# Patient Record
Sex: MALE | Race: BLACK OR AFRICAN AMERICAN | NOT HISPANIC OR LATINO | Employment: FULL TIME | URBAN - METROPOLITAN AREA
[De-identification: names, ages, dates, MRNs, and addresses within clinical notes are randomized per-mention and may not be internally consistent; named-entity substitution may affect disease eponyms.]

---

## 2018-07-02 ENCOUNTER — HOSPITAL ENCOUNTER (EMERGENCY)
Facility: OTHER | Age: 58
Discharge: HOME | End: 2018-07-02
Attending: EMERGENCY MEDICINE
Payer: MEDICAID

## 2018-07-02 VITALS
TEMPERATURE: 98 F | OXYGEN SATURATION: 96 % | HEART RATE: 66 BPM | HEIGHT: 74 IN | SYSTOLIC BLOOD PRESSURE: 172 MMHG | RESPIRATION RATE: 16 BRPM | BODY MASS INDEX: 35.29 KG/M2 | WEIGHT: 275 LBS | DIASTOLIC BLOOD PRESSURE: 94 MMHG

## 2018-07-02 DIAGNOSIS — L03.116 CELLULITIS AND ABSCESS OF LEFT LOWER EXTREMITY: Primary | ICD-10-CM

## 2018-07-02 DIAGNOSIS — L02.416 CELLULITIS AND ABSCESS OF LEFT LOWER EXTREMITY: Primary | ICD-10-CM

## 2018-07-02 PROCEDURE — 25000003 PHARM REV CODE 250: Performed by: EMERGENCY MEDICINE

## 2018-07-02 PROCEDURE — 99283 EMERGENCY DEPT VISIT LOW MDM: CPT | Mod: 25

## 2018-07-02 PROCEDURE — 10061 I&D ABSCESS COMP/MULTIPLE: CPT

## 2018-07-02 RX ORDER — SULFAMETHOXAZOLE AND TRIMETHOPRIM 800; 160 MG/1; MG/1
1 TABLET ORAL
Status: COMPLETED | OUTPATIENT
Start: 2018-07-02 | End: 2018-07-02

## 2018-07-02 RX ORDER — ACETAMINOPHEN 500 MG
1000 TABLET ORAL
Status: COMPLETED | OUTPATIENT
Start: 2018-07-02 | End: 2018-07-02

## 2018-07-02 RX ORDER — IBUPROFEN 400 MG/1
400 TABLET ORAL 3 TIMES DAILY PRN
Qty: 20 TABLET | Refills: 0 | Status: SHIPPED | OUTPATIENT
Start: 2018-07-02 | End: 2021-02-04

## 2018-07-02 RX ORDER — LIDOCAINE HYDROCHLORIDE AND EPINEPHRINE 20; 10 MG/ML; UG/ML
1 INJECTION, SOLUTION INFILTRATION; PERINEURAL ONCE
Status: COMPLETED | OUTPATIENT
Start: 2018-07-02 | End: 2018-07-02

## 2018-07-02 RX ORDER — SULFAMETHOXAZOLE AND TRIMETHOPRIM 800; 160 MG/1; MG/1
1 TABLET ORAL 2 TIMES DAILY
Qty: 14 TABLET | Refills: 0 | Status: SHIPPED | OUTPATIENT
Start: 2018-07-02 | End: 2018-07-09

## 2018-07-02 RX ORDER — IBUPROFEN 400 MG/1
400 TABLET ORAL
Status: COMPLETED | OUTPATIENT
Start: 2018-07-02 | End: 2018-07-02

## 2018-07-02 RX ADMIN — IBUPROFEN 400 MG: 400 TABLET, FILM COATED ORAL at 08:07

## 2018-07-02 RX ADMIN — LIDOCAINE HYDROCHLORIDE,EPINEPHRINE BITARTRATE 1 ML: 20; .01 INJECTION, SOLUTION INFILTRATION; PERINEURAL at 07:07

## 2018-07-02 RX ADMIN — SULFAMETHOXAZOLE AND TRIMETHOPRIM 1 TABLET: 800; 160 TABLET ORAL at 08:07

## 2018-07-02 RX ADMIN — ACETAMINOPHEN 1000 MG: 500 TABLET, FILM COATED ORAL at 08:07

## 2018-07-02 NOTE — ED PROVIDER NOTES
Encounter Date: 7/2/2018    SCRIBE #1 NOTE: I, Karissagray Reeder, am scribing for, and in the presence of, Dr. Song.       History     Chief Complaint   Patient presents with    Abscess     inside of left thigh, started off as a skin tag but noticed yesteday it was hurting and today, it is hard and very painful     Time seen by provider: 7:45 AM    This is a 57 y.o. male, with history of DM, who presents with complaint of abscess to the left thigh. He reports it began as a skin tag that he has had most of his life. The area became hard and sore two days ago. He denies redness or drainage. He reports history of abscess.      The history is provided by the patient.     Review of patient's allergies indicates:  Allergies not on file  Past Medical History:   Diagnosis Date    Diabetes mellitus     Pt is no longer has DM, managed with diet    Psoriasis      History reviewed. No pertinent surgical history.  Family History   Problem Relation Age of Onset    Diabetes Mother     Hypertension Mother     Cancer Mother         Breast     Diabetes Father     Hypertension Father     Cancer Father         Prostate     Social History   Substance Use Topics    Smoking status: Never Smoker    Smokeless tobacco: Never Used    Alcohol use Yes      Comment: Socially     Review of Systems   Constitutional: Negative for chills and fever.   HENT: Negative for sore throat.    Respiratory: Negative for shortness of breath.    Cardiovascular: Negative for chest pain.   Gastrointestinal: Negative for nausea.   Genitourinary: Negative for dysuria.   Musculoskeletal: Negative for back pain.   Skin: Positive for wound (abscess). Negative for color change and rash.        Negative for drainage.   Neurological: Negative for weakness and numbness.   Hematological: Does not bruise/bleed easily.       Physical Exam     Initial Vitals [07/02/18 0729]   BP Pulse Resp Temp SpO2   (!) 174/98 64 16 97.8 °F (36.6 °C) 98 %      MAP       --          Physical Exam    Nursing note and vitals reviewed.  Constitutional: He appears well-developed and well-nourished. He is not diaphoretic. No distress.   HENT:   Head: Normocephalic and atraumatic.   Eyes: Conjunctivae and EOM are normal. No scleral icterus.   Neck: Normal range of motion. Neck supple.   Cardiovascular: Normal rate, regular rhythm and normal heart sounds. Exam reveals no gallop and no friction rub.    No murmur heard.  Pulmonary/Chest: Breath sounds normal. No respiratory distress. He has no wheezes. He has no rhonchi. He has no rales.   Musculoskeletal: Normal range of motion. He exhibits no edema.   Neurological: He is alert and oriented to person, place, and time.   Skin: Skin is warm and dry.   Over proximal and medial left thigh, he has a skin tag that is swollen, edematous, and tender. Approximately 3 cm in diameter and raised above the rest of the skin by another 3 cm. Fluctuance at base with surrounding induration, erythema, and tenderness. There is no erythema, tenderness, or skin changes to scrotum or perineum. No streaking erythema.         ED Course   I & D - Incision and Drainage  Date/Time: 7/2/2018 8:41 AM  Performed by: JESSY DUDLEY  Authorized by: JESSY DUDLEY   Consent Done: Not Needed  Type: abscess  Body area: lower extremity (top of skin tag)  Location details: left leg  Anesthesia: local infiltration    Anesthesia:  Local Anesthetic: lidocaine 1% with epinephrine  Anesthetic total: 5 mL  Patient sedated: no  Scalpel size: 11  Incision type: 1.5 cm.  Drainage: purulent  Drainage amount: scant  Wound treatment: incision and  drainage  Packing material: none  Patient tolerance: Patient tolerated the procedure well with no immediate complications    I & D - Incision and Drainage  Date/Time: 7/2/2018 8:50 AM  Performed by: JESSY DUDLEY  Authorized by: JESSY DUDLEY   Consent Done: Not Needed  Type: abscess  Body area: lower extremity (base of skin tag)  Location  details: left leg  Anesthesia: local infiltration    Anesthesia:  Local Anesthetic: lidocaine 1% with epinephrine  Patient sedated: no  Scalpel size: 11  Incision type: 1.5 cm.  Drainage: bloody and  purulent  Drainage amount: copious  Wound treatment: incision and  drainage  Packing material: none  Patient tolerance: Patient tolerated the procedure well with no immediate complications        Labs Reviewed - No data to display       Imaging Results    None          Medical Decision Making:   ED Management:  Well-appearing patient presents complaining of a skin abscess.  He reports he had a skin tag for some time but it has never been painful or large.  Works for the weekend swelling in size became painful.  He has a history of a prior abscesses armpit in this feels very similar.  On exam it does appear indurated and fluctuant.  Incision and drainage performed as described above.  When I incised the top of the skin tag limited purulence comes out, but flexion is remains, so I performed another incision at the base, this does provide extensive return of purulence.  Dressed.  Started on Bactrim.  Encouraged follow up with primary care 3 days for wound check, return here if worse.    I did have an extensive talk regarding signs to return for and need for follow up. Patient expressed understanding and will monitor symptoms closely and follow-up as needed.    MARILYN Song M.D.  07/02/2018  2:15 PM              Scribe Attestation:   Scribe #1: I performed the above scribed service and the documentation accurately describes the services I performed. I attest to the accuracy of the note.    Attending Attestation:           Physician Attestation for Scribe:  Physician Attestation Statement for Scribe #1: I, Dr. Song, reviewed documentation, as scribed by Karissa Reeder in my presence, and it is both accurate and complete.                    Clinical Impression:     1. Cellulitis and abscess of left lower extremity                                  Remy Song MD  07/02/18 1292

## 2018-07-02 NOTE — ED NOTES
"Pt presents with c/o a skin tag/abcess located on the upper left inner thigh. Pt states he has had it for "as long as he can remember." He states it recently became painful and hard on Saturday. Per pt it got larger over the years as he gained weight. Pt states pain only occurs when ambulating, when sitting/lying down it does not bother him. Pt has no further complaints/conceerns. Pt is AAOx4, on continuous BP and pulse ox monitoring.    "

## 2019-08-16 ENCOUNTER — OFFICE VISIT (OUTPATIENT)
Dept: OPHTHALMOLOGY | Facility: CLINIC | Age: 59
End: 2019-08-16
Payer: COMMERCIAL

## 2019-08-16 ENCOUNTER — HOSPITAL ENCOUNTER (EMERGENCY)
Facility: HOSPITAL | Age: 59
Discharge: HOME | End: 2019-08-16
Attending: EMERGENCY MEDICINE
Payer: COMMERCIAL

## 2019-08-16 VITALS
HEART RATE: 52 BPM | SYSTOLIC BLOOD PRESSURE: 163 MMHG | WEIGHT: 267 LBS | BODY MASS INDEX: 33.2 KG/M2 | DIASTOLIC BLOOD PRESSURE: 95 MMHG | TEMPERATURE: 98 F | RESPIRATION RATE: 20 BRPM | HEIGHT: 75 IN | OXYGEN SATURATION: 98 %

## 2019-08-16 DIAGNOSIS — B02.30 HERPES ZOSTER WITH OPHTHALMIC COMPLICATION, UNSPECIFIED HERPES ZOSTER EYE DISEASE: Primary | ICD-10-CM

## 2019-08-16 DIAGNOSIS — H40.023 OAG (OPEN ANGLE GLAUCOMA) SUSPECT, HIGH RISK, BILATERAL: ICD-10-CM

## 2019-08-16 DIAGNOSIS — B02.39 DERMATITIS OF EYELID OF LEFT EYE DUE TO HERPES ZOSTER: ICD-10-CM

## 2019-08-16 PROCEDURE — 99999 PR PBB SHADOW E&M-EST. PATIENT-LVL II: CPT | Mod: PBBFAC,,, | Performed by: OPHTHALMOLOGY

## 2019-08-16 PROCEDURE — 99282 EMERGENCY DEPT VISIT SF MDM: CPT

## 2019-08-16 PROCEDURE — 92004 COMPRE OPH EXAM NEW PT 1/>: CPT | Mod: S$GLB,,, | Performed by: OPHTHALMOLOGY

## 2019-08-16 PROCEDURE — 92004 PR EYE EXAM, NEW PATIENT,COMPREHESV: ICD-10-PCS | Mod: S$GLB,,, | Performed by: OPHTHALMOLOGY

## 2019-08-16 PROCEDURE — 99999 PR PBB SHADOW E&M-EST. PATIENT-LVL II: ICD-10-PCS | Mod: PBBFAC,,, | Performed by: OPHTHALMOLOGY

## 2019-08-16 RX ORDER — VALACYCLOVIR HYDROCHLORIDE 1 G/1
1000 TABLET, FILM COATED ORAL 3 TIMES DAILY
Qty: 21 TABLET | Refills: 0 | Status: SHIPPED | OUTPATIENT
Start: 2019-08-16 | End: 2020-05-11

## 2019-08-16 RX ORDER — CEFDINIR 300 MG/1
CAPSULE ORAL
Refills: 0 | COMMUNITY
Start: 2019-08-14 | End: 2021-02-04

## 2019-08-16 RX ORDER — ERYTHROMYCIN 5 MG/G
OINTMENT OPHTHALMIC
Qty: 1 TUBE | Refills: 1 | Status: SHIPPED | OUTPATIENT
Start: 2019-08-16 | End: 2020-05-11

## 2019-08-16 NOTE — PATIENT INSTRUCTIONS
Take one Valtrex three times a day for one week.  Use the ointment on open blisters three times a day until crusted.  Appointment with Dr. Mcgraw to see if you need treatment for glaucoma.

## 2019-08-16 NOTE — PROGRESS NOTES
HPI     Follow-up      Additional comments: Herpes zoster with ophthalmic complication              Comments     Patient here for EDfu for shingles poss ocular involvement.  Pt states bumps on forehead and eye x yesterday, but started on left side   scalp area.  Pt states OS red and irritated.  No eye pain or drops.    I have personally interviewed the patient, reviewed the history and   examined the patient and agree with the technician's exam.     Mother has glaucoma.          Last edited by Sae Jon MD on 8/16/2019 10:53 AM. (History)            Assessment /Plan     For exam results, see Encounter Report.    Dermatitis of eyelid of left eye due to herpes zoster  -     valACYclovir (VALTREX) 1000 MG tablet; Take 1 tablet (1,000 mg total) by mouth 3 (three) times daily. for 7 days  Dispense: 21 tablet; Refill: 0  -     erythromycin (ROMYCIN) ophthalmic ointment; Apply to lesions of left upper eyelid three times a day until lesions are crusted over.  Dispense: 1 Tube; Refill: 1    OAG (open angle glaucoma) suspect, high risk, bilateral      Medications for shingles as prescribed above. Appointment with Dr. Mcgraw for possible treatment for glaucoma. Return to me as needed.

## 2019-08-16 NOTE — ED NOTES
Spoke to ophthalmology at [facility]. RN set up an appointment for pt today at 1000 with Dr. Jon at [facility] opthalmology

## 2019-08-16 NOTE — ED NOTES
Pt reports red rash noted to left side of forehead and scalp and to left eye. Pt reports that rash started on the scalp 2 days ago and progressed to the left forehead and to left eye. Pt reports left is noted to be teary and red but denies blurry vision or any difficulty seeing. Pt reports slight swelling to under left eye. Pt denies rash any where else on the body. Denies itchiness of rash but reports mild tenderness. Pt is NAD.

## 2019-08-16 NOTE — ED PROVIDER NOTES
Encounter Date: 8/16/2019    SCRIBE #1 NOTE: I, Elizabeth Arana, am scribing for, and in the presence of,  Dr. Ortiz . I have scribed the entire note.       History     Chief Complaint   Patient presents with    Rash     rash to left side of scalp/forehead that is radiating down patients face to left eye area x 2 days     Time seen by provider: 7:20 AM    This is a 58 y.o. male who presents with complaint of rash that started two days ago. Pt reports he went to the urgent care on 8/14//2019 for pain to the top of his scalp and ear pain. He states at this time he was prescribed medication for his ear infection. Pt stats he then began noticing a rash that has now traveled down from his scalp to the left side of his forehead. He notes associated headache but denies any visual disturbance or fever. Pt has a PMHx of Psoriasis.    The history is provided by the patient.     Review of patient's allergies indicates:  No Known Allergies  Past Medical History:   Diagnosis Date    Diabetes mellitus     Pt is no longer has DM, managed with diet    Psoriasis      History reviewed. No pertinent surgical history.  Family History   Problem Relation Age of Onset    Diabetes Mother     Hypertension Mother     Cancer Mother         Breast     Diabetes Father     Hypertension Father     Cancer Father         Prostate     Social History     Tobacco Use    Smoking status: Never Smoker    Smokeless tobacco: Never Used   Substance Use Topics    Alcohol use: Yes     Comment: Socially    Drug use: No     Review of Systems   Constitutional: Negative for fever.   HENT: Negative for sore throat.    Eyes: Negative for visual disturbance.   Respiratory: Negative for shortness of breath.    Cardiovascular: Negative for chest pain.   Gastrointestinal: Negative for nausea.   Genitourinary: Negative for dysuria.   Musculoskeletal: Negative for back pain.   Skin: Positive for rash.   Neurological: Positive for headaches. Negative for  weakness.   Hematological: Does not bruise/bleed easily.       Physical Exam     Initial Vitals [08/16/19 0708]   BP Pulse Resp Temp SpO2   135/80 80 20 98 °F (36.7 °C) 99 %      MAP       --         Physical Exam    Nursing note and vitals reviewed.  Constitutional: He appears well-developed and well-nourished.   HENT:   Head: Normocephalic and atraumatic.   Eyes: EOM are normal. Pupils are equal, round, and reactive to light.   Left sided conjunctival injection.   Cardiovascular: Normal rate, regular rhythm and normal heart sounds.   No murmur heard.  Pulmonary/Chest: Breath sounds normal. No respiratory distress. He has no wheezes.   Neurological: He is alert and oriented to person, place, and time.   Skin: Skin is warm and dry.   Multiple scattered vesicular lesions with an erythematous base involving the left temporal scalp extending to the left forehead and nose.  Lesions do not cross midline.      Psychiatric: He has a normal mood and affect.         ED Course   Procedures  Labs Reviewed - No data to display          Medical Decision Making:   ED Management:  60-year-old male with herpes zoster.  There appears to be some involvement of the left eye. [facility] was notified so that I could arrange close follow-up with Ophthalmology.  An appointment has been made for the patient at 10:00 a.m. this morning.                      Clinical Impression:       ICD-10-CM ICD-9-CM   1. Herpes zoster with ophthalmic complication, unspecified herpes zoster eye disease B02.30 053.29            I, Dr. Yong Ortiz, personally performed the services described in this documentation. All medical record entries made by the scribe were at my direction and in my presence. I have reviewed the chart and agree that the record reflects my personal performance and is accurate and complete. Yong Ortiz MD.  9:43 AM 08/16/2019                      Yong Ortiz MD  08/16/19 0994

## 2019-10-18 ENCOUNTER — OFFICE VISIT (OUTPATIENT)
Dept: OPHTHALMOLOGY | Facility: CLINIC | Age: 59
End: 2019-10-18
Payer: COMMERCIAL

## 2019-10-18 DIAGNOSIS — Z86.19 HISTORY OF SHINGLES: ICD-10-CM

## 2019-10-18 DIAGNOSIS — H40.023 OPEN ANGLE WITH BORDERLINE FINDINGS AND HIGH GLAUCOMA RISK IN BOTH EYES: Primary | ICD-10-CM

## 2019-10-18 DIAGNOSIS — H52.4 MYOPIA OF BOTH EYES WITH ASTIGMATISM AND PRESBYOPIA: ICD-10-CM

## 2019-10-18 DIAGNOSIS — H52.203 MYOPIA OF BOTH EYES WITH ASTIGMATISM AND PRESBYOPIA: ICD-10-CM

## 2019-10-18 DIAGNOSIS — H52.13 MYOPIA OF BOTH EYES WITH ASTIGMATISM AND PRESBYOPIA: ICD-10-CM

## 2019-10-18 DIAGNOSIS — H25.13 NUCLEAR SCLEROSIS OF BOTH EYES: ICD-10-CM

## 2019-10-18 DIAGNOSIS — H35.54 RPE (RETINAL PIGMENT EPITHELIUM) HYPERTROPHY: ICD-10-CM

## 2019-10-18 PROCEDURE — 99999 PR PBB SHADOW E&M-EST. PATIENT-LVL II: CPT | Mod: PBBFAC,,, | Performed by: OPHTHALMOLOGY

## 2019-10-18 PROCEDURE — 92014 PR EYE EXAM, EST PATIENT,COMPREHESV: ICD-10-PCS | Mod: S$GLB,,, | Performed by: OPHTHALMOLOGY

## 2019-10-18 PROCEDURE — 92020 GONIOSCOPY: CPT | Mod: S$GLB,,, | Performed by: OPHTHALMOLOGY

## 2019-10-18 PROCEDURE — 92020 PR SPECIAL EYE EVAL,GONIOSCOPY: ICD-10-PCS | Mod: S$GLB,,, | Performed by: OPHTHALMOLOGY

## 2019-10-18 PROCEDURE — 99999 PR PBB SHADOW E&M-EST. PATIENT-LVL II: ICD-10-PCS | Mod: PBBFAC,,, | Performed by: OPHTHALMOLOGY

## 2019-10-18 PROCEDURE — 92014 COMPRE OPH EXAM EST PT 1/>: CPT | Mod: S$GLB,,, | Performed by: OPHTHALMOLOGY

## 2019-10-18 PROCEDURE — 92250 COLOR FUNDUS PHOTOGRAPHY - OU - BOTH EYES: ICD-10-PCS | Mod: S$GLB,,, | Performed by: OPHTHALMOLOGY

## 2019-10-18 PROCEDURE — 92250 FUNDUS PHOTOGRAPHY W/I&R: CPT | Mod: S$GLB,,, | Performed by: OPHTHALMOLOGY

## 2019-10-18 NOTE — PROGRESS NOTES
HPI     Glaucoma      Additional comments: gaucoma eval              Comments     Pt here for glaucoma evaluation per Dr. Jon. Pt states that he was not   being treated for glaucoma before today.  + FmHx - mother    1. Glaucoma Suspect  2. Hx Dermatitis OS due to HZV           Last edited by Marylin Mcgraw MD on 10/18/2019  8:38 AM. (History)            Assessment /Plan     For exam results, see Encounter Report.    Open angle with borderline findings and high glaucoma risk in both eyes  -     Hall Visual Field - OU - Extended - Both Eyes; Future  -     Posterior Segment OCT Optic Nerve- Both eyes; Future  -     Color Fundus Photography - OU - Both Eyes    Nuclear sclerosis of both eyes    RPE (retinal pigment epithelium) hypertrophy    Myopia of both eyes with astigmatism and presbyopia    History of shingles        Glaucoma supspect 2/2 lrg CDR ou - 0.75 ou   IOP nl 20-21 ou   Gonio open     Retinal pigmetary hypertrophy along inf arcades OU    ? Congential vs old inflammatory disease - it is VERY symmetric btw the 2 eyes     NS - mild     Myopia / astigmatism / hyperopia    Glasses     H/O shingles    V1 on left - 8/2019   No ocular complications     PLAN  Hold off on glaucoma gtts   Photos today   Consult retina - retinal pigment changes     F/U with HVF / and OCT ou // CCT   Fix Epic glaucoma abstract next visit

## 2019-10-18 NOTE — LETTER
October 18, 2019      Sae Jon MD  [address]  [facility] [address]           [facility]  [address]  [facility] [address]  Phone: [phone]  Fax: [phone]          Patient: Nuno Tejeda   MR Number: [id number]   YOB: 1960   Date of Visit: 10/18/2019       Dear Dr. Sae Jon:    Thank you for referring Nuno Tejeda to me for evaluation. Attached you will find relevant portions of my assessment and plan of care.    If you have questions, please do not hesitate to call me. I look forward to following Nuno Tejeda along with you.    Sincerely,    Marylin Mcgraw MD    Enclosure  CC:  No Recipients    If you would like to receive this communication electronically, please contact [email or web address] or [phone] to request more information on [employer] Link access.    For providers and/or their staff who would like to refer a patient to [facility], please contact us through our one-stop-shop provider referral line, [facility], at [phone].    If you feel you have received this communication in error or would no longer like to receive these types of communications, please e-mail [email or web address]

## 2019-10-30 ENCOUNTER — OFFICE VISIT (OUTPATIENT)
Dept: PODIATRY | Facility: CLINIC | Age: 59
End: 2019-10-30
Payer: COMMERCIAL

## 2019-10-30 VITALS
SYSTOLIC BLOOD PRESSURE: 152 MMHG | BODY MASS INDEX: 32.51 KG/M2 | HEART RATE: 67 BPM | DIASTOLIC BLOOD PRESSURE: 94 MMHG | HEIGHT: 76 IN | WEIGHT: 267 LBS

## 2019-10-30 DIAGNOSIS — M20.42 HAMMER TOES OF BOTH FEET: ICD-10-CM

## 2019-10-30 DIAGNOSIS — B35.1 ONYCHOMYCOSIS DUE TO DERMATOPHYTE: ICD-10-CM

## 2019-10-30 DIAGNOSIS — M20.41 HAMMER TOES OF BOTH FEET: ICD-10-CM

## 2019-10-30 DIAGNOSIS — B35.3 TINEA PEDIS OF BOTH FEET: ICD-10-CM

## 2019-10-30 DIAGNOSIS — L84 CORN OR CALLUS: Primary | ICD-10-CM

## 2019-10-30 PROCEDURE — 99203 PR OFFICE/OUTPT VISIT, NEW, LEVL III, 30-44 MIN: ICD-10-PCS | Mod: ,,, | Performed by: PODIATRIST

## 2019-10-30 PROCEDURE — 3008F BODY MASS INDEX DOCD: CPT | Mod: CPTII,S$GLB,, | Performed by: PODIATRIST

## 2019-10-30 PROCEDURE — 3008F PR BODY MASS INDEX (BMI) DOCUMENTED: ICD-10-PCS | Mod: CPTII,S$GLB,, | Performed by: PODIATRIST

## 2019-10-30 PROCEDURE — 99999 PR PBB SHADOW E&M-EST. PATIENT-LVL III: ICD-10-PCS | Mod: PBBFAC,,, | Performed by: PODIATRIST

## 2019-10-30 PROCEDURE — 17999 UNLISTD PX SKN MUC MEMB SUBQ: CPT | Mod: CSM,S$GLB,, | Performed by: PODIATRIST

## 2019-10-30 PROCEDURE — 99999 PR PBB SHADOW E&M-EST. PATIENT-LVL III: CPT | Mod: PBBFAC,,, | Performed by: PODIATRIST

## 2019-10-30 PROCEDURE — 17999 PR NON-COVERED FOOT CARE: ICD-10-PCS | Mod: CSM,S$GLB,, | Performed by: PODIATRIST

## 2019-10-30 PROCEDURE — 99203 OFFICE O/P NEW LOW 30 MIN: CPT | Mod: ,,, | Performed by: PODIATRIST

## 2019-10-30 RX ORDER — CICLOPIROX OLAMINE 7.7 MG/G
CREAM TOPICAL 2 TIMES DAILY
Qty: 90 G | Refills: 2 | Status: SHIPPED | OUTPATIENT
Start: 2019-10-30

## 2019-10-30 RX ORDER — CICLOPIROX 80 MG/ML
SOLUTION TOPICAL NIGHTLY
Qty: 6.6 ML | Refills: 11 | Status: SHIPPED | OUTPATIENT
Start: 2019-10-30 | End: 2022-02-15

## 2019-10-30 NOTE — PROGRESS NOTES
Subjective:      Patient ID: Nuno Tejeda is a 58 y.o. male.    Chief Complaint: Diabetes Mellitus (dr kiarra denny/ will see pcp within the next 6 wks); Diabetic Foot Exam; and Callouses    Nuno is a 58 y.o. male who presents to the clinic upon referral from Dr. Brunson  for evaluation and treatment of diabetic feet. Nuno has a past medical history of Cataract, Diabetes mellitus, Glaucoma, and Psoriasis. Patient relates no major problem with feet. Only complaints today consist of Diabetes, increased risk amputation needing evaluation/management/optomization of foot care - relates diet control only - not in problem list, no meds for DM, no PCP listed to verify.        CC2 thickl hard callus and thick discolored toenails both feet.  Gradual onset, worsening over past several weeks, aggravated by increased weight bearing, shoe gear, pressure.  No previous medical treatment.  OTC med not helping. Denies trauma, surgery.    PCP: [facility]    Date Last Seen by PCP:   Chief Complaint   Patient presents with    Diabetes Mellitus     dr kiarra denny/ will see pcp within the next 6 wks    Diabetic Foot Exam    Edward         Current shoe gear: Casual shoes    No results found for: HGBA1C          Review of Systems   Constitution: Negative for chills, diaphoresis, fever, malaise/fatigue and night sweats.   Cardiovascular: Negative for claudication, cyanosis, leg swelling and syncope.   Skin: Positive for nail changes and suspicious lesions. Negative for color change, dry skin, rash and unusual hair distribution.   Musculoskeletal: Negative for falls, joint pain, joint swelling, muscle cramps, muscle weakness and stiffness.   Gastrointestinal: Negative for constipation, diarrhea, nausea and vomiting.   Neurological: Positive for sensory change. Negative for brief paralysis, disturbances in coordination, focal weakness, numbness, paresthesias and tremors.           Objective:       Physical Exam   Constitutional: He is oriented to person, place, and time. He appears well-developed and well-nourished. He is cooperative. No distress.   Cardiovascular:   Pulses:       Popliteal pulses are 2+ on the right side, and 2+ on the left side.        Dorsalis pedis pulses are 2+ on the right side, and 2+ on the left side.        Posterior tibial pulses are 2+ on the right side, and 2+ on the left side.   Capillary refill 3 seconds all toes/distal feet, all toes/both feet warm to touch.      Negative lymphadenopathy bilateral popliteal fossa and tarsal tunnel.      Negavie lower extremity edema bilateral.     Musculoskeletal:        Right ankle: He exhibits normal range of motion, no swelling, no ecchymosis, no deformity, no laceration and normal pulse. Achilles tendon normal. Achilles tendon exhibits no pain, no defect and normal Patricia's test results.   ;Patient has hammertoes of digits   2-5 bilateral                partially reducible without symptom today.    Otherwise, Normal angle, base, station of gait. All ten toes without clubbing, cyanosis, or signs of ischemia.  No pain to palpation bilateral lower extremities.  Range of motion, stability, muscle strength, and muscle tone normal bilateral feet and legs.        Lymphadenopathy: No inguinal adenopathy noted on the right or left side.   Negative lymphadenopathy bilateral popliteal fossa and tarsal tunnel.    Negative lymphangitic streaking bilateral feet/ankles/legs.   Neurological: He is alert and oriented to person, place, and time. He has normal strength. He displays no atrophy and no tremor. No sensory deficit. He exhibits normal muscle tone. Gait normal.   Reflex Scores:       Patellar reflexes are 2+ on the right side and 2+ on the left side.       Achilles reflexes are 2+ on the right side and 2+ on the left side.  Negative tinel sign to percussion sural, superficial peroneal, deep peroneal, saphenous, and posterior tibial nerves right  and left ankles and feet.       Skin: Skin is warm, dry and intact. Capillary refill takes 2 to 3 seconds. No abrasion, no bruising, no burn, no ecchymosis, no laceration, no lesion and no rash noted. He is not diaphoretic. No cyanosis or erythema. No pallor. Nails show no clubbing.     Focal hyperkeratotic lesion consisting entirely of hyperkeratotic tissue without open skin, drainage, pus, fluctuance, malodor, or signs of infection plantar right and left 2nd mtpj.    Toenails 1st, 2nd, 3rd, 4th, 5th  bilateral are hypertrophic thickened 2-3 mm, dystrophic, discolored tanish brown with tan, gray crumbly subungual debris.  Tender to distal nail plate pressure, without periungual skin abnormality of each.    Dry scale with superficial flakes over an erythematous base plantar feet bilateral without ulceration, drainage, pus, tracking, fluctuance, malodor, or cardinal signs infection.    Otherwise, Skin is normal age and health appropriate color, turgor, texture, and temperature bilateral lower extremities without ulceration, hyperpigmentation, discoloration, masses nodules or cords palpated.  No ecchymosis, erythema, edema, or cardinal signs of infection bilateral lower extremities.     Psychiatric: He has a normal mood and affect.             Assessment:       Encounter Diagnoses   Name Primary?    Corn or callus Yes    Onychomycosis due to dermatophyte     Hammer toes of both feet          Plan:       Nuno was seen today for diabetes mellitus, diabetic foot exam and callouses.    Diagnoses and all orders for this visit:    Corn or callus    Onychomycosis due to dermatophyte    Hammer toes of both feet    Other orders  -     ciclopirox (PENLAC) 8 % Soln; Apply topically nightly.  -     ciclopirox (LOPROX) 0.77 % Crea; Apply topically 2 (two) times daily.      I counseled the patient on his conditions, their implications and medical management.        - Shoe inspection. Diabetic Foot Education. Patient reminded of  the importance of good nutrition and blood sugar control to help prevent podiatric complications of diabetes. Patient instructed on proper foot hygeine. We discussed wearing proper shoe gear, daily foot inspections, never walking without protective shoe gear, never putting sharp instruments to feet, routine podiatric visits at least annually.    Discussed conservative treatment with shoes of adequate dimensions, material, and style to alleviate symptoms and delay or prevent surgical intervention.    Rx penlac, loprox cream    Non covered footcare:      With the patient's permission, I debrided hyperkeratotic lesion(s) as above totaling     2           to, not  Including dermis with sterile #15 blade.  Patient tolerated the procedure well and related significant relief.              No follow-ups on file.

## 2019-11-25 ENCOUNTER — OFFICE VISIT (OUTPATIENT)
Dept: OPHTHALMOLOGY | Facility: CLINIC | Age: 59
End: 2019-11-25
Payer: COMMERCIAL

## 2019-11-25 VITALS — DIASTOLIC BLOOD PRESSURE: 92 MMHG | SYSTOLIC BLOOD PRESSURE: 147 MMHG | HEART RATE: 74 BPM

## 2019-11-25 DIAGNOSIS — H35.50 HEREDITARY RETINAL DYSTROPHY: Primary | ICD-10-CM

## 2019-11-25 PROCEDURE — 92134 POSTERIOR SEGMENT OCT RETINA (OCULAR COHERENCE TOMOGRAPHY)-BOTH EYES: ICD-10-PCS | Mod: S$GLB,,, | Performed by: OPHTHALMOLOGY

## 2019-11-25 PROCEDURE — 92014 PR EYE EXAM, EST PATIENT,COMPREHESV: ICD-10-PCS | Mod: S$GLB,,, | Performed by: OPHTHALMOLOGY

## 2019-11-25 PROCEDURE — 92225 PR SPECIAL EYE EXAM, INITIAL: ICD-10-PCS | Mod: LT,S$GLB,, | Performed by: OPHTHALMOLOGY

## 2019-11-25 PROCEDURE — 92225 PR SPECIAL EYE EXAM, INITIAL: CPT | Mod: RT,S$GLB,, | Performed by: OPHTHALMOLOGY

## 2019-11-25 PROCEDURE — 92134 CPTRZ OPH DX IMG PST SGM RTA: CPT | Mod: S$GLB,,, | Performed by: OPHTHALMOLOGY

## 2019-11-25 PROCEDURE — 92014 COMPRE OPH EXAM EST PT 1/>: CPT | Mod: S$GLB,,, | Performed by: OPHTHALMOLOGY

## 2019-11-25 PROCEDURE — 99999 PR PBB SHADOW E&M-EST. PATIENT-LVL III: ICD-10-PCS | Mod: PBBFAC,,, | Performed by: OPHTHALMOLOGY

## 2019-11-25 PROCEDURE — 99999 PR PBB SHADOW E&M-EST. PATIENT-LVL III: CPT | Mod: PBBFAC,,, | Performed by: OPHTHALMOLOGY

## 2019-11-25 NOTE — PROGRESS NOTES
HPI     59 y.o. Male pt referred by Dr. Mcgraw from Dr. Jon    Pt states that eyes and visiondoing well, referred here for a retina consult.  No pains,flashes,floaters, or double vision.     Eye Med(s) - none    OCT - inferior macular atrophy OU  Fovea OU    FP - RPE atrophy and pigmentation along IT arcade OU      A/P    1. Hereditary retinal dystrophy OU  RPE atrophy with pigmentation along IT arcade OU  ?sector RP variant  ASx  Good foveal anatomy OU  FP/OCT today  Check ERG     1 year OCT/FP

## 2019-11-25 NOTE — LETTER
November 25, 2019      Marylin Mcgraw MD  [address]  [place] [address]           [facility] - Ophthalmology  [address]  [facility] [address]  Phone: [phone]  Fax: [phone]          Patient: Nuno Tejeda   MR Number: [id number]   YOB: 1960   Date of Visit: 11/25/2019       Dear Dr. Marylin Mcgraw:    Thank you for referring Nuno Tejeda to me for evaluation. Attached you will find relevant portions of my assessment and plan of care.    If you have questions, please do not hesitate to call me. I look forward to following Nuno Tejeda along with you.    Sincerely,    HERON Mcqueen MD    Enclosure  CC:  No Recipients    If you would like to receive this communication electronically, please contact [email or web address] or [phone] to request more information on [employer] Link access.    For providers and/or their staff who would like to refer a patient to [facility], please contact us through our one-stop-shop provider referral line, [facility], at [phone].    If you feel you have received this communication in error or would no longer like to receive these types of communications, please e-mail [email or web address]

## 2020-01-10 ENCOUNTER — TELEPHONE (OUTPATIENT)
Dept: OPHTHALMOLOGY | Facility: CLINIC | Age: 60
End: 2020-01-10

## 2020-01-10 NOTE — PROGRESS NOTES
HPI     DLS: 10/18/19    Pt here for HVF review;    Meds; No GTTS    1. Glaucoma Suspect   2. 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 (History)            Assessment /Plan     For exam results, see Encounter Report.    Open angle with borderline findings and high glaucoma risk in both eyes    Nuclear sclerosis of both eyes    Hereditary retinal dystrophy    RPE (retinal pigment epithelium) hypertrophy    Myopia of both eyes with astigmatism and presbyopia    History of shingles           Glaucoma (type and duration)   LTG suspect vs RNFL changes and VF changes 2/2 hereditary ret dystrophy - dx 1/2020    First HVF   1/2020   First photos   10/2019   Treatment / Drops started   None            Family history    + MOTHER         Glaucoma meds    None         H/O adverse rxn to glaucoma drops    none        LASERS    none        GLAUCOMA SURGERIES    none        OTHER EYE SURGERIES    none        CDR    0.75/0.75        Tbase    19-20 // 19-21          Tmax    20/21            Ttarget    ?             HVF    1 test 2020 to  2020 - SAD  od // SAD os    (? If this VF defect is from the hereditary retinal dystrophy  along the inf arcades )        Gonio    +3-4 ou         CCT    566/561        OCT    1 test 2020 to 2020 - RNFL - dec. G/TI/NI od // dec. G/NI/TI os        HRT    ? test 20? to 20? - MR - / od // ? os        Disc photos    10/2019     - Ttoday    19/19  - Test done today    HVF // OCT -     Hereditary retinal dystrophy  along inf arcades OU    -  it is VERY symmetric btw the 2 eyes    Is likely cause of the SAD on HVF testing ou    Saw Richar - - F/U 1 year     NS - mild     Myopia / astigmatism / hyperopia    Glasses     H/O shingles    V1 on left - 8/2019   No ocular complications     PLAN  I suspect the RNFL changes and the HVF changes are 2/2 to the hereditary retinal dystrophy  But pt does have a FmHx of glaucoma and  LRG cdr OU // ?? LTG     BEGIN LATANOPROST  OU Q DAY - / MORNING     F/U 4 MONTHS WITH HRT // iop CHECK ON LATANOPROST Q AM

## 2020-01-13 ENCOUNTER — OFFICE VISIT (OUTPATIENT)
Dept: OPHTHALMOLOGY | Facility: CLINIC | Age: 60
End: 2020-01-13
Payer: COMMERCIAL

## 2020-01-13 ENCOUNTER — CLINICAL SUPPORT (OUTPATIENT)
Dept: OPHTHALMOLOGY | Facility: CLINIC | Age: 60
End: 2020-01-13
Payer: COMMERCIAL

## 2020-01-13 DIAGNOSIS — H40.023 OPEN ANGLE WITH BORDERLINE FINDINGS AND HIGH GLAUCOMA RISK IN BOTH EYES: ICD-10-CM

## 2020-01-13 DIAGNOSIS — H35.50 HEREDITARY RETINAL DYSTROPHY: ICD-10-CM

## 2020-01-13 DIAGNOSIS — H52.4 MYOPIA OF BOTH EYES WITH ASTIGMATISM AND PRESBYOPIA: ICD-10-CM

## 2020-01-13 DIAGNOSIS — H52.203 MYOPIA OF BOTH EYES WITH ASTIGMATISM AND PRESBYOPIA: ICD-10-CM

## 2020-01-13 DIAGNOSIS — H40.023 OPEN ANGLE WITH BORDERLINE FINDINGS AND HIGH GLAUCOMA RISK IN BOTH EYES: Primary | ICD-10-CM

## 2020-01-13 DIAGNOSIS — Z86.19 HISTORY OF SHINGLES: ICD-10-CM

## 2020-01-13 DIAGNOSIS — H25.13 NUCLEAR SCLEROSIS OF BOTH EYES: ICD-10-CM

## 2020-01-13 DIAGNOSIS — H52.13 MYOPIA OF BOTH EYES WITH ASTIGMATISM AND PRESBYOPIA: ICD-10-CM

## 2020-01-13 PROCEDURE — 92012 PR EYE EXAM, EST PATIENT,INTERMED: ICD-10-PCS | Mod: S$GLB,,, | Performed by: OPHTHALMOLOGY

## 2020-01-13 PROCEDURE — 92133 POSTERIOR SEGMENT OCT OPTIC NERVE(OCULAR COHERENCE TOMOGRAPHY) - OU - BOTH EYES: ICD-10-PCS | Mod: S$GLB,,, | Performed by: OPHTHALMOLOGY

## 2020-01-13 PROCEDURE — 92012 INTRM OPH EXAM EST PATIENT: CPT | Mod: S$GLB,,, | Performed by: OPHTHALMOLOGY

## 2020-01-13 PROCEDURE — 92133 CPTRZD OPH DX IMG PST SGM ON: CPT | Mod: S$GLB,,, | Performed by: OPHTHALMOLOGY

## 2020-01-13 PROCEDURE — 99999 PR PBB SHADOW E&M-EST. PATIENT-LVL II: ICD-10-PCS | Mod: PBBFAC,,, | Performed by: OPHTHALMOLOGY

## 2020-01-13 PROCEDURE — 92083 EXTENDED VISUAL FIELD XM: CPT | Mod: S$GLB,,, | Performed by: OPHTHALMOLOGY

## 2020-01-13 PROCEDURE — 99999 PR PBB SHADOW E&M-EST. PATIENT-LVL II: CPT | Mod: PBBFAC,,, | Performed by: OPHTHALMOLOGY

## 2020-01-13 PROCEDURE — 92083 HUMPHREY VISUAL FIELD - OU - BOTH EYES: ICD-10-PCS | Mod: S$GLB,,, | Performed by: OPHTHALMOLOGY

## 2020-01-13 RX ORDER — LATANOPROST 50 UG/ML
1 SOLUTION/ DROPS OPHTHALMIC DAILY
Qty: 3 BOTTLE | Refills: 3 | Status: SHIPPED | OUTPATIENT
Start: 2020-01-13 | End: 2020-09-27

## 2020-05-07 NOTE — PROGRESS NOTES
HPI     DLS: 1/13/20    Pt here for 4 month check;  Pt states no problems since starting the Latanoprost eye drops.     Meds:   Latanoprost qhs ou      1. Glaucoma Suspect - LTG suspect (+VF changes - ? 2/2 retinal dystrophy)   2. 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 NS ou   4. 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 Refractive error     Last edited by Marylin Mcgraw MD on 5/11/2020  1:49 PM. (History)            Assessment /Plan     For exam results, see Encounter Report.    Open angle with borderline findings and high glaucoma risk in both eyes    Nuclear sclerosis of both eyes    Hereditary retinal dystrophy    Myopia of both eyes with astigmatism and presbyopia    History of shingles           Glaucoma (type and duration)   LTG suspect vs RNFL changes and VF changes 2/2 hereditary ret dystrophy - dx 1/2020    First HVF   1/2020   First photos   10/2019   Treatment / Drops started   None            Family history    + MOTHER         Glaucoma meds    None         H/O adverse rxn to glaucoma drops    none        LASERS    none        GLAUCOMA SURGERIES    none        OTHER EYE SURGERIES    none        CDR    0.75/0.75        Tbase    19-20 // 19-21          Tmax    20/21            Ttarget    ?             HVF    1 test 2020 to  2020 - SAD  od // SAD os    (? If this VF defect is from the hereditary retinal dystrophy  along the inf arcades )        Gonio    +3-4 ou         CCT    566/561        OCT    1 test 2020 to 2020 - RNFL - dec. G/TI/NI od // dec. G/NI/TI os        HRT    ? test 20? to 20? - MR - / od // ? os        Disc photos    10/2019     - Ttoday    15/16  - Test done today    Gonio  // IOP check on latanoprost     Hereditary retinal dystrophy  along inf arcades OU    -  it is VERY symmetric btw the 2 eyes    Is likely cause of the SAD on HVF testing ou    Saw Richar - - F/U 1 year     NS - mild     Myopia / astigmatism / hyperopia    Glasses      H/O shingles    V1 on left - 8/2019   No ocular complications - resolved     PLAN  I suspect the RNFL changes and the HVF changes are 2/2 to the hereditary retinal dystrophy  But pt does have a FmHx of glaucoma and  LRG cdr OU // ?? LTG     Cont LATANOPROST OU Q DAY - / MORNING    Good resp 19-20 ou --> 15/16    F/U 4 MONTHS with HRT    Refer to optometry for glasses - main campus

## 2020-05-11 ENCOUNTER — OFFICE VISIT (OUTPATIENT)
Dept: OPHTHALMOLOGY | Facility: CLINIC | Age: 60
End: 2020-05-11
Payer: COMMERCIAL

## 2020-05-11 DIAGNOSIS — H35.50 HEREDITARY RETINAL DYSTROPHY: ICD-10-CM

## 2020-05-11 DIAGNOSIS — H52.4 MYOPIA OF BOTH EYES WITH ASTIGMATISM AND PRESBYOPIA: ICD-10-CM

## 2020-05-11 DIAGNOSIS — H25.13 NUCLEAR SCLEROSIS OF BOTH EYES: ICD-10-CM

## 2020-05-11 DIAGNOSIS — H52.13 MYOPIA OF BOTH EYES WITH ASTIGMATISM AND PRESBYOPIA: ICD-10-CM

## 2020-05-11 DIAGNOSIS — H52.203 MYOPIA OF BOTH EYES WITH ASTIGMATISM AND PRESBYOPIA: ICD-10-CM

## 2020-05-11 DIAGNOSIS — H40.023 OPEN ANGLE WITH BORDERLINE FINDINGS AND HIGH GLAUCOMA RISK IN BOTH EYES: Primary | ICD-10-CM

## 2020-05-11 DIAGNOSIS — Z86.19 HISTORY OF SHINGLES: ICD-10-CM

## 2020-05-11 PROCEDURE — 92012 PR EYE EXAM, EST PATIENT,INTERMED: ICD-10-PCS | Mod: S$GLB,,, | Performed by: OPHTHALMOLOGY

## 2020-05-11 PROCEDURE — 99999 PR PBB SHADOW E&M-EST. PATIENT-LVL II: CPT | Mod: PBBFAC,,, | Performed by: OPHTHALMOLOGY

## 2020-05-11 PROCEDURE — 92012 INTRM OPH EXAM EST PATIENT: CPT | Mod: S$GLB,,, | Performed by: OPHTHALMOLOGY

## 2020-05-11 PROCEDURE — 99999 PR PBB SHADOW E&M-EST. PATIENT-LVL II: ICD-10-PCS | Mod: PBBFAC,,, | Performed by: OPHTHALMOLOGY

## 2020-05-11 PROCEDURE — 92020 GONIOSCOPY: CPT | Mod: S$GLB,,, | Performed by: OPHTHALMOLOGY

## 2020-05-11 PROCEDURE — 92020 PR SPECIAL EYE EVAL,GONIOSCOPY: ICD-10-PCS | Mod: S$GLB,,, | Performed by: OPHTHALMOLOGY

## 2020-06-10 ENCOUNTER — OFFICE VISIT (OUTPATIENT)
Dept: URGENT CARE | Facility: CLINIC | Age: 60
End: 2020-06-10
Payer: COMMERCIAL

## 2020-06-10 VITALS
RESPIRATION RATE: 18 BRPM | BODY MASS INDEX: 33.2 KG/M2 | WEIGHT: 267 LBS | TEMPERATURE: 97 F | DIASTOLIC BLOOD PRESSURE: 93 MMHG | HEART RATE: 63 BPM | HEIGHT: 75 IN | OXYGEN SATURATION: 98 % | SYSTOLIC BLOOD PRESSURE: 152 MMHG

## 2020-06-10 DIAGNOSIS — U07.1 COVID-19: Primary | ICD-10-CM

## 2020-06-10 PROCEDURE — U0003 INFECTIOUS AGENT DETECTION BY NUCLEIC ACID (DNA OR RNA); SEVERE ACUTE RESPIRATORY SYNDROME CORONAVIRUS 2 (SARS-COV-2) (CORONAVIRUS DISEASE [COVID-19]), AMPLIFIED PROBE TECHNIQUE, MAKING USE OF HIGH THROUGHPUT TECHNOLOGIES AS DESCRIBED BY CMS-2020-01-R: HCPCS

## 2020-06-10 PROCEDURE — 99201 PR OFFICE/OUTPT VISIT,NEW,LEVL I: ICD-10-PCS | Mod: S$GLB,,, | Performed by: PHYSICIAN ASSISTANT

## 2020-06-10 PROCEDURE — 99201 PR OFFICE/OUTPT VISIT,NEW,LEVL I: CPT | Mod: S$GLB,,, | Performed by: PHYSICIAN ASSISTANT

## 2020-06-10 NOTE — PROGRESS NOTES
"Subjective:       Patient ID: Nuno Tejeda is a 59 y.o. male.    Vitals:  height is 6' 3" (1.905 m) and weight is 121.1 kg (267 lb). His tympanic temperature is 97.2 °F (36.2 °C). His blood pressure is 152/93 (abnormal) and his pulse is 63. His respiration is 18 and oxygen saturation is 98%.     Chief Complaint: COVID-19 Concerns    HPI   Pt presents to clinic wanting COVID testing due to positive exposure at work.     ROS    Objective:      Physical Exam      Assessment:       1. COVID-19        Plan:     Counseled patient and answered questions in regards to COVID-19 testing and diagnosis.    COVID-19  -     COVID-19 Routine Screening        Please follow up with your Primary care provider within 2-5 days if your signs and symptoms have not resolved or worsen.     If your condition worsens or fails to improve we recommend that you receive another evaluation at the emergency room immediately or contact your primary medical clinic to discuss your concerns.   You must understand that you have received an Urgent Care treatment only and that you may be released before all of your medical problems are known or treated. You, the patient, will arrange for follow up care as instructed.     RED FLAGS/WARNING SYMPTOMS DISCUSSED WITH PATIENT THAT WOULD WARRANT EMERGENT MEDICAL ATTENTION. PATIENT VERBALIZED UNDERSTANDING.           "

## 2020-06-12 ENCOUNTER — TELEPHONE (OUTPATIENT)
Dept: URGENT CARE | Facility: CLINIC | Age: 60
End: 2020-06-12

## 2020-06-12 LAB — SARS-COV-2 RNA RESP QL NAA+PROBE: DETECTED

## 2020-06-12 NOTE — TELEPHONE ENCOUNTER
"Given results of positive COVID test. "I feel great". Lost sense of smell and taste and has not yet returned. To be considered active disease. Will call with worsening symptoms, will need to come in for an "all clear sign", per employer, after symptoms subside.   Your test was POSITIVE for COVID-19 (coronavirus).       Prevention steps for patients with confirmed COVID-19       Stay home and stay away from family members and friends. The CDC says, you can leave home after these three things have happened: 1) You have had no fever for at least 72 hours (that is three full days of no fever without the use of medicine that reduces fevers) 2) AND other symptoms have improved (for example, when your cough or shortness of breath have improved) 3) AND at least 10 days have passed since your symptoms first appeared.   Separate yourself from other people and animals in your home.   Call ahead before visiting your doctor.   Wear a facemask.   Cover your coughs and sneezes.   Wash your hands often with soap and water; hand  can be used, too.   Avoid sharing personal household items.   Wipe down surfaces used daily.   Monitor your symptoms. Seek prompt medical attention if your illness is worsening (e.g., difficulty breathing).    Before seeking care, call your healthcare provider.   If you have a medical emergency and need to call 911, notify the dispatch personnel that you have, or are being evaluated for COVID-19. If possible, put on a facemask before emergency medical services arrive.        Recommended precautions for household members, intimate partners, and caregivers in a home setting of a patient with symptomatic laboratory-confirmed COVID-19 or a patient under investigation.  Household members, intimate partners, and caregivers in the home setting awaiting tests results have close contact with a person with symptomatic, laboratory-confirmed COVID-19 or a person under investigation. Close contacts " should monitor their health; they should call their provider right away if they develop symptoms suggestive of COVID-19 (e.g., fever, cough, shortness of breath).    Close contacts should also follow these recommendations:   Make sure that you understand and can help the patient follow their provider's instructions for medication(s) and care. You should help the patient with basic needs in the home and provide support for getting groceries, prescriptions, and other personal needs.   Monitor the patient's symptoms. If the patient is getting sicker, call his or her healthcare provider and tell them that the patient has laboratory-confirmed COVID-19. If the patient has a medical emergency and you need to call 911, notify the dispatch personnel that the patient has, or is being evaluated for COVID-19.   Household members should stay in another room or be  from the patient. Household members should use a separate bedroom and bathroom, if available.   Prohibit visitors.   Household members should care for any pets in the home.   Make sure that shared spaces in the home have good air flow, such as by an air conditioner or an opened window, weather permitting.   Perform hand hygiene frequently. Wash your hands often with soap and water for at least 20 seconds or use an alcohol-based hand  (that contains > 60% alcohol) covering all surfaces of your hands and rubbing them together until they feel dry. Soap and water should be used preferentially.   Avoid touching your eyes, nose, and mouth.   The patient should wear a facemask. If the patient is not able to wear a facemask (for example, because it causes trouble breathing), caregivers should wear a mask when they are in the same room as the patient.   Wear a disposable facemask and gloves when you touch or have contact with the patient's blood, stool, or body fluids, such as saliva, sputum, nasal mucus, vomit, urine.  o Throw out disposable  facemasks and gloves after using them. Do not reuse.  o When removing personal protective equipment, first remove and dispose of gloves. Then, immediately clean your hands with soap and water or alcohol-based hand . Next, remove and dispose of facemask, and immediately clean your hands again with soap and water or alcohol-based hand .   You should not share dishes, drinking glasses, cups, eating utensils, towels, bedding, or other items with the patient. After the patient uses these items, you should wash them thoroughly (see below Wash laundry thoroughly).   Clean all high-touch surfaces, such as counters, tabletops, doorknobs, bathroom fixtures, toilets, phones, keyboards, tablets, and bedside tables, every day. Also, clean any surfaces that may have blood, stool, or body fluids on them.   Use a household cleaning spray or wipe, according to the label instructions. Labels contain instructions for safe and effective use of the cleaning product including precautions you should take when applying the product, such as wearing gloves and making sure you have good ventilation during use of the product.   Wash laundry thoroughly.  o Immediately remove and wash clothes or bedding that have blood, stool, or body fluids on them.  o Wear disposable gloves while handling soiled items and keep soiled items away from your body. Clean your hands (with soap and water or an alcohol-based hand ) immediately after removing your gloves.  o Read and follow directions on labels of laundry or clothing items and detergent. In general, using a normal laundry detergent according to washing machine instructions and dry thoroughly using the warmest temperatures recommended on the clothing label.   Place all used disposable gloves, facemasks, and other contaminated items in a lined container before disposing of them with other household waste. Clean your hands (with soap and water or an alcohol-based hand  ) immediately after handling these items. Soap and water should be used preferentially if hands are visibly dirty.   Discuss any additional questions with your state or local health department or healthcare provider. Check available hours when contacting your local health department.    For more information see CDC link below.      https://www.cdc.gov/coronavirus/2019-ncov/hcp/guidance-prevent-spread.html#precautions        Sources:  [facility], [place] [facility] and [facility]     Sincerely,     Oc Yates PA-C

## 2020-06-25 ENCOUNTER — OCCUPATIONAL HEALTH (OUTPATIENT)
Dept: URGENT CARE | Facility: CLINIC | Age: 60
End: 2020-06-25
Payer: COMMERCIAL

## 2020-06-25 VITALS — TEMPERATURE: 99 F

## 2020-06-25 DIAGNOSIS — Z56.89 WORK-RELATED CONDITION: Primary | ICD-10-CM

## 2020-06-25 DIAGNOSIS — Z20.822 EXPOSURE TO COVID-19 VIRUS: ICD-10-CM

## 2020-06-25 PROCEDURE — U0003 INFECTIOUS AGENT DETECTION BY NUCLEIC ACID (DNA OR RNA); SEVERE ACUTE RESPIRATORY SYNDROME CORONAVIRUS 2 (SARS-COV-2) (CORONAVIRUS DISEASE [COVID-19]), AMPLIFIED PROBE TECHNIQUE, MAKING USE OF HIGH THROUGHPUT TECHNOLOGIES AS DESCRIBED BY CMS-2020-01-R: HCPCS

## 2020-06-25 NOTE — PATIENT INSTRUCTIONS
Instructions for Patients Awaiting COVID-19 Test Results    You will either be called with your test result or it will be released to the patient portal.  If you have any questions about your test, please visit [email or web address] or call our COVID-19 information line at [phone].    Prevention steps for patients with confirmed or suspected COVID-19     Stay home except to get medical care.   Separate yourself from other people and animals in your home   Call ahead before visiting your doctor.   Wear a facemask.   Cover your coughs and sneezes.   Clean your hands often.   Avoid sharing personal household items.   Clean all high-touch surfaces every day.   Monitor your symptoms. Seek prompt medical attention if your illness is worsening (e.g., difficulty breathing). Before seeking care, call your healthcare provider.   If you have a medical emergency and need to call 911, notify the dispatch personnel that you have, or are being evaluated for COVID-19. If possible, put on a facemask before emergency medical services arrive.   Discontinuing home isolation. Call your provider about guidance to discontinue home isolation.    Recommended precautions for household members, intimate partners, and caregivers in a nonhealthcare setting of a patient with symptomatic laboratory-confirmed COVID-19 or a patient under investigation.  Household members, intimate partners, and caregivers in a nonhealthcare setting may have close contact with a person with symptomatic, laboratory-confirmed COVID-19 or a person under investigation. Close contacts should monitor their health; they should call their healthcare provider right away if they develop symptoms suggestive of COVID-19 (e.g., fever, cough, shortness of breath).    Close contacts should also follow these recommendations:   Make sure that you understand and can help the patient follow their healthcare provider's instructions for medication(s) and care.  You should help the patient with basic needs in the home and provide support for getting groceries, prescriptions, and other personal needs.   Monitor the patient's symptoms. If the patient is getting sicker, call his or her healthcare provider and tell them that the patient has laboratory-confirmed COVID-19. This will help the healthcare provider's office take steps to keep other people in the office or waiting room from getting infected. Ask the healthcare provider to call the local or [facility] health department for additional guidance. If the patient has a medical emergency and you need to call 911, notify the dispatch personnel that the patient has, or is being evaluated for COVID-19.   Household members should stay in another room or be  from the patient as much as possible. Household members should use a separate bedroom and bathroom, if available.   Prohibit visitors who do not have an essential need to be in the home.   Household members should care for any pets in the home. Do not handle pets or other animals while sick.   Make sure that shared spaces in the home have good air flow, such as by an air conditioner or an opened window, weather permitting.   Perform hand hygiene frequently. Wash your hands often with soap and water for at least 20 seconds or use an alcohol-based hand  that contains 60 to 95% alcohol, covering all surfaces of your hands and rubbing them together until they feel dry. Soap and water should be used preferentially if hands are visibly dirty.   Avoid touching your eyes, nose, and mouth with unwashed hands.   The patient should wear a facemask when you are around other people. If the patient is not able to wear a facemask (for example, because it causes trouble breathing), you, as the caregiver should wear a mask when you are in the same room as the patient.   Wear a disposable facemask and gloves when you touch or have contact with the patient's blood, stool,  or body fluids, such as saliva, sputum, nasal mucus, vomit, urine.  o Throw out disposable facemasks and gloves after using them. Do not reuse.  o When removing personal protective equipment, first remove and dispose of gloves. Then, immediately clean your hands with soap and water or alcohol-based hand . Next, remove and dispose of facemask, and immediately clean your hands again with soap and water or alcohol-based hand .   Avoid sharing household items with the patient. You should not share dishes, drinking glasses, cups, eating utensils, towels, bedding, or other items. After the patient uses these items, you should wash them thoroughly (see below Wash laundry thoroughly).   Clean all high-touch surfaces, such as counters, tabletops, doorknobs, bathroom fixtures, toilets, phones, keyboards, tablets, and bedside tables, every day. Also, clean any surfaces that may have blood, stool, or body fluids on them.   Use a household cleaning spray or wipe, according to the label instructions. Labels contain instructions for safe and effective use of the cleaning product including precautions you should take when applying the product, such as wearing gloves and making sure you have good ventilation during use of the product.   Wash laundry thoroughly.  o Immediately remove and wash clothes or bedding that have blood, stool, or body fluids on them.  o Wear disposable gloves while handling soiled items and keep soiled items away from your body. Clean your hands (with soap and water or an alcohol-based hand ) immediately after removing your gloves.  o Read and follow directions on labels of laundry or clothing items and detergent. In general, using a normal laundry detergent according to washing machine instructions and dry thoroughly using the warmest temperatures recommended on the clothing label.   Place all used disposable gloves, facemasks, and other contaminated items in a lined  container before disposing of them with other household waste. Clean your hands (with soap and water or an alcohol-based hand ) immediately after handling these items. Soap and water should be used preferentially if hands are visibly dirty.   Discuss any additional questions with your state or local health department or healthcare provider. Check available hours when contacting your local health department.    For more information see CDC link below.      https://www.cdc.gov/coronavirus/2019-ncov/hcp/guidance-prevent-spread.html#precautions        Sources:  CDC, [place] Department [facility] and [facility]        If not allergic,take tylenol (acetominophen) for fever control, chills, or body aches every 4 hours. Do not exceed 4000 mg/ day.If not allergic, take Motrin (Ibuprofen) every 4 hours for fever, chills, pain or inflammation. Do not exceed 2400 mg/day. You can alternate taking tylenol and motrin.    If you were prescribed a narcotic medication, do not drive or operate heavy equipment or machinery while taking these medications.  You must understand that you've received an Urgent Care treatment only and that you may be released before all your medical problems are known or treated. You, the patient, will arrange for follow up care as instructed.  Follow up with your PCP or specialty clinic as directed in the next 1-2 weeks if not improved or as needed.  You can call [phone] to schedule an appointment with the appropriate provider.  If your condition worsens we recommend that you receive another evaluation at the emergency room immediately or contact your primary medical clinics after hours call service to discuss your concerns.    Please return here or go to the Emergency Department for any concerns or worsening of condition.    If you have been referred to another provider and wish to call to check on the status of your referral, please call [facility] at [phone]

## 2020-06-25 NOTE — PROGRESS NOTES
Subjective:       Patient ID: Nuno Tejeda is a 59 y.o. male.    Chief Complaint: COVID-19 Concerns    Pt is here today for COVID test. He states he tested positive on the 8th. He states he is better but still having loss of taste.  Work is requesting re-test    Other  The problem has been gradually improving. Pertinent negatives include no arthralgias, chest pain, chills, congestion, coughing, fatigue, fever, headaches, joint swelling, myalgias, nausea, rash, sore throat, vertigo or vomiting. He has tried nothing for the symptoms.       Constitution: Negative for chills, fatigue and fever.   HENT: Negative for congestion and sore throat.    Neck: Negative for painful lymph nodes.   Cardiovascular: Negative for chest pain and leg swelling.   Eyes: Negative for double vision and blurred vision.   Respiratory: Negative for cough and shortness of breath.    Gastrointestinal: Negative for nausea, vomiting and diarrhea.   Genitourinary: Negative for dysuria, frequency and urgency.   Musculoskeletal: Negative for joint pain, joint swelling, muscle cramps and muscle ache.   Skin: Negative for color change, pale and rash.   Allergic/Immunologic: Negative for seasonal allergies.   Neurological: Negative for dizziness, history of vertigo, light-headedness, passing out and headaches.   Hematologic/Lymphatic: Negative for swollen lymph nodes, easy bruising/bleeding and history of blood clots. Does not bruise/bleed easily.   Psychiatric/Behavioral: Negative for nervous/anxious, sleep disturbance and depression. The patient is not nervous/anxious.         Objective:      Physical Exam    Assessment:       1. Work-related condition    2. Exposure to Covid-19 Virus        Plan:       Will call with test results.   Patient Instructions   Instructions for Patients Awaiting COVID-19 Test Results    You will either be called with your test result or it will be released to the patient portal.  If you have any questions about your  test, please visit [email or web address] or call our COVID-19 information line at [phone].    Prevention steps for patients with confirmed or suspected COVID-19     Stay home except to get medical care.   Separate yourself from other people and animals in your home   Call ahead before visiting your doctor.   Wear a facemask.   Cover your coughs and sneezes.   Clean your hands often.   Avoid sharing personal household items.   Clean all high-touch surfaces every day.   Monitor your symptoms. Seek prompt medical attention if your illness is worsening (e.g., difficulty breathing). Before seeking care, call your healthcare provider.   If you have a medical emergency and need to call 911, notify the dispatch personnel that you have, or are being evaluated for COVID-19. If possible, put on a facemask before emergency medical services arrive.   Discontinuing home isolation. Call your provider about guidance to discontinue home isolation.    Recommended precautions for household members, intimate partners, and caregivers in a nonhealthcare setting of a patient with symptomatic laboratory-confirmed COVID-19 or a patient under investigation.  Household members, intimate partners, and caregivers in a nonhealthcare setting may have close contact with a person with symptomatic, laboratory-confirmed COVID-19 or a person under investigation. Close contacts should monitor their health; they should call their healthcare provider right away if they develop symptoms suggestive of COVID-19 (e.g., fever, cough, shortness of breath).    Close contacts should also follow these recommendations:   Make sure that you understand and can help the patient follow their healthcare provider's instructions for medication(s) and care. You should help the patient with basic needs in the home and provide support for getting groceries, prescriptions, and other personal needs.   Monitor the patient's symptoms. If the patient  is getting sicker, call his or her healthcare provider and tell them that the patient has laboratory-confirmed COVID-19. This will help the healthcare provider's office take steps to keep other people in the office or waiting room from getting infected. Ask the healthcare provider to call the local or [facility] health department for additional guidance. If the patient has a medical emergency and you need to call 911, notify the dispatch personnel that the patient has, or is being evaluated for COVID-19.   Household members should stay in another room or be  from the patient as much as possible. Household members should use a separate bedroom and bathroom, if available.   Prohibit visitors who do not have an essential need to be in the home.   Household members should care for any pets in the home. Do not handle pets or other animals while sick.   Make sure that shared spaces in the home have good air flow, such as by an air conditioner or an opened window, weather permitting.   Perform hand hygiene frequently. Wash your hands often with soap and water for at least 20 seconds or use an alcohol-based hand  that contains 60 to 95% alcohol, covering all surfaces of your hands and rubbing them together until they feel dry. Soap and water should be used preferentially if hands are visibly dirty.   Avoid touching your eyes, nose, and mouth with unwashed hands.   The patient should wear a facemask when you are around other people. If the patient is not able to wear a facemask (for example, because it causes trouble breathing), you, as the caregiver should wear a mask when you are in the same room as the patient.   Wear a disposable facemask and gloves when you touch or have contact with the patient's blood, stool, or body fluids, such as saliva, sputum, nasal mucus, vomit, urine.  o Throw out disposable facemasks and gloves after using them. Do not reuse.  o When removing personal protective equipment,  first remove and dispose of gloves. Then, immediately clean your hands with soap and water or alcohol-based hand . Next, remove and dispose of facemask, and immediately clean your hands again with soap and water or alcohol-based hand .   Avoid sharing household items with the patient. You should not share dishes, drinking glasses, cups, eating utensils, towels, bedding, or other items. After the patient uses these items, you should wash them thoroughly (see below Wash laundry thoroughly).   Clean all high-touch surfaces, such as counters, tabletops, doorknobs, bathroom fixtures, toilets, phones, keyboards, tablets, and bedside tables, every day. Also, clean any surfaces that may have blood, stool, or body fluids on them.   Use a household cleaning spray or wipe, according to the label instructions. Labels contain instructions for safe and effective use of the cleaning product including precautions you should take when applying the product, such as wearing gloves and making sure you have good ventilation during use of the product.   Wash laundry thoroughly.  o Immediately remove and wash clothes or bedding that have blood, stool, or body fluids on them.  o Wear disposable gloves while handling soiled items and keep soiled items away from your body. Clean your hands (with soap and water or an alcohol-based hand ) immediately after removing your gloves.  o Read and follow directions on labels of laundry or clothing items and detergent. In general, using a normal laundry detergent according to washing machine instructions and dry thoroughly using the warmest temperatures recommended on the clothing label.   Place all used disposable gloves, facemasks, and other contaminated items in a lined container before disposing of them with other household waste. Clean your hands (with soap and water or an alcohol-based hand ) immediately after handling these items. Soap and water  should be used preferentially if hands are visibly dirty.   Discuss any additional questions with your state or local health department or healthcare provider. Check available hours when contacting your local health department.    For more information see CDC link below.      https://www.cdc.gov/coronavirus/2019-ncov/hcp/guidance-prevent-spread.html#precautions        Sources:  [facility], [place] [facility] and [facility]        If not allergic,take tylenol (acetominophen) for fever control, chills, or body aches every 4 hours. Do not exceed 4000 mg/ day.If not allergic, take Motrin (Ibuprofen) every 4 hours for fever, chills, pain or inflammation. Do not exceed 2400 mg/day. You can alternate taking tylenol and motrin.    If you were prescribed a narcotic medication, do not drive or operate heavy equipment or machinery while taking these medications.  You must understand that you've received an Urgent Care treatment only and that you may be released before all your medical problems are known or treated. You, the patient, will arrange for follow up care as instructed.  Follow up with your PCP or specialty clinic as directed in the next 1-2 weeks if not improved or as needed.  You can call [phone] to schedule an appointment with the appropriate provider.  If your condition worsens we recommend that you receive another evaluation at the emergency room immediately or contact your primary medical clinics after hours call service to discuss your concerns.    Please return here or go to the Emergency Department for any concerns or worsening of condition.    If you have been referred to another provider and wish to call to check on the status of your referral, please call [facility] Scheduling at [phone]                  No follow-ups on file.

## 2020-06-29 ENCOUNTER — TELEPHONE (OUTPATIENT)
Dept: URGENT CARE | Facility: CLINIC | Age: 60
End: 2020-06-29

## 2020-06-29 LAB — SARS-COV-2 RNA RESP QL NAA+PROBE: DETECTED

## 2020-06-29 NOTE — TELEPHONE ENCOUNTER
Attempted to call for positive COVID. Pt was retested after previous positive for work. Phone rang with no answer and no voicemail. Attempt #1

## 2020-07-02 ENCOUNTER — OCCUPATIONAL HEALTH (OUTPATIENT)
Dept: URGENT CARE | Facility: CLINIC | Age: 60
End: 2020-07-02

## 2020-07-02 DIAGNOSIS — Z02.1 PRE-EMPLOYMENT EXAMINATION: Primary | ICD-10-CM

## 2020-07-02 PROCEDURE — 99499 UNLISTED E&M SERVICE: CPT | Mod: S$GLB,,, | Performed by: PHYSICIAN ASSISTANT

## 2020-07-02 PROCEDURE — 99499 RETURN TO WORK EXAM: BASIC: ICD-10-PCS | Mod: S$GLB,,, | Performed by: PHYSICIAN ASSISTANT

## 2020-07-02 RX ORDER — CLOBETASOL PROPIONATE 0.5 MG/G
OINTMENT TOPICAL
COMMUNITY
Start: 2020-05-25 | End: 2021-05-25

## 2020-07-02 RX ORDER — GUSELKUMAB 100 MG/ML
100 INJECTION SUBCUTANEOUS
COMMUNITY
Start: 2020-06-05 | End: 2020-09-03

## 2020-07-02 RX ORDER — CLOBETASOL PROPIONATE 0.46 MG/ML
SOLUTION TOPICAL
COMMUNITY
Start: 2020-05-25 | End: 2021-02-04 | Stop reason: SDUPTHER

## 2020-07-02 RX ORDER — CLOBETASOL PROPIONATE 0.46 MG/ML
SOLUTION TOPICAL
COMMUNITY
Start: 2020-06-01

## 2020-07-02 RX ORDER — CLOBETASOL PROPIONATE 0.5 MG/G
OINTMENT TOPICAL
COMMUNITY
Start: 2020-05-26

## 2020-09-11 NOTE — PROGRESS NOTES
HPI     DLS: 5/11/20    Pt here for HRT review;    Meds:   Latanoprost qhs ou       1. Glaucoma Suspect - LTG suspect (+VF changes - ? 2/2 retinal dystrophy)   2. 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 NS ou   4. 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 Refractive error     Last edited by Zo Chavez on 9/14/2020  1:15 PM. (History)            Assessment /Plan     For exam results, see Encounter Report.    Open angle with borderline findings and high glaucoma risk in both eyes    Nuclear sclerosis of both eyes    Hereditary retinal dystrophy    Myopia of both eyes with astigmatism and presbyopia    History of shingles    RPE (retinal pigment epithelium) hypertrophy           Glaucoma (type and duration)   LTG suspect vs RNFL changes and VF changes 2/2 hereditary ret dystrophy - dx 1/2020    First HVF   1/2020   First photos   10/2019   Treatment / Drops started   None            Family history    + MOTHER         Glaucoma meds    None         H/O adverse rxn to glaucoma drops    none        LASERS    none        GLAUCOMA SURGERIES    none        OTHER EYE SURGERIES    none        CDR    0.75/0.75        Tbase    19-20 // 19-21          Tmax    20/21            Ttarget    ?             HVF    1 test 2020 to  2020 - SAD  od // SAD os    (? If this VF defect is from the hereditary retinal dystrophy  along the inf arcades )        Gonio    +3-4 ou         CCT    566/561        OCT    1 test 2020 to 2020 - RNFL - dec. G/TI/NI od // dec. G/NI/TI os        HRT    1 test 2020 to 2020 - MR -  Dec. I, sania ST/N od // dec. SN/IN, bord N os /// CDR 0.75 od // 0.72 os          Disc photos    10/2019     - Ttoday    15/16  - Test done today   IOP check on latanoprost // HRT     Hereditary retinal dystrophy  along inf arcades OU    -  it is VERY symmetric btw the 2 eyes    Is likely cause of the SAD on HVF testing ou    Douglas Mcqueen - - F/U 1 year     NS - mild     Myopia /  astigmatism / hyperopia    Glasses     H/O shingles    V1 on left - 8/2019   No ocular complications - resolved     PLAN  I suspect the RNFL changes and the HVF changes are 2/2 to the hereditary retinal dystrophy  But pt does have a FmHx of glaucoma and  LRG cdr OU // ?? LTG     Cont LATANOPROST OU Q DAY - / MORNING    Good resp 19-20 ou --> 15/16    F/U 4 MONTHS with HVF / DFE / OCT    Refer to optometry for glasses - main campus

## 2020-09-14 ENCOUNTER — OFFICE VISIT (OUTPATIENT)
Dept: OPHTHALMOLOGY | Facility: CLINIC | Age: 60
End: 2020-09-14
Payer: COMMERCIAL

## 2020-09-14 DIAGNOSIS — H35.54 RPE (RETINAL PIGMENT EPITHELIUM) HYPERTROPHY: ICD-10-CM

## 2020-09-14 DIAGNOSIS — Z86.19 HISTORY OF SHINGLES: ICD-10-CM

## 2020-09-14 DIAGNOSIS — H35.50 HEREDITARY RETINAL DYSTROPHY: ICD-10-CM

## 2020-09-14 DIAGNOSIS — H52.203 MYOPIA OF BOTH EYES WITH ASTIGMATISM AND PRESBYOPIA: ICD-10-CM

## 2020-09-14 DIAGNOSIS — H40.023 OPEN ANGLE WITH BORDERLINE FINDINGS AND HIGH GLAUCOMA RISK IN BOTH EYES: Primary | ICD-10-CM

## 2020-09-14 DIAGNOSIS — H52.13 MYOPIA OF BOTH EYES WITH ASTIGMATISM AND PRESBYOPIA: ICD-10-CM

## 2020-09-14 DIAGNOSIS — H52.4 MYOPIA OF BOTH EYES WITH ASTIGMATISM AND PRESBYOPIA: ICD-10-CM

## 2020-09-14 DIAGNOSIS — H25.13 NUCLEAR SCLEROSIS OF BOTH EYES: ICD-10-CM

## 2020-09-14 PROCEDURE — 92012 PR EYE EXAM, EST PATIENT,INTERMED: ICD-10-PCS | Mod: S$GLB,,, | Performed by: OPHTHALMOLOGY

## 2020-09-14 PROCEDURE — 92012 INTRM OPH EXAM EST PATIENT: CPT | Mod: S$GLB,,, | Performed by: OPHTHALMOLOGY

## 2020-09-14 PROCEDURE — 92133 HEIDELBERG RETINA TOMOGRAPHY (HRT) - OU - BOTH EYES: ICD-10-PCS | Mod: S$GLB,,, | Performed by: OPHTHALMOLOGY

## 2020-09-14 PROCEDURE — 92133 CPTRZD OPH DX IMG PST SGM ON: CPT | Mod: S$GLB,,, | Performed by: OPHTHALMOLOGY

## 2020-09-14 PROCEDURE — 99999 PR PBB SHADOW E&M-EST. PATIENT-LVL III: CPT | Mod: PBBFAC,,, | Performed by: OPHTHALMOLOGY

## 2020-09-14 PROCEDURE — 99999 PR PBB SHADOW E&M-EST. PATIENT-LVL III: ICD-10-PCS | Mod: PBBFAC,,, | Performed by: OPHTHALMOLOGY

## 2020-10-08 ENCOUNTER — OFFICE VISIT (OUTPATIENT)
Dept: PODIATRY | Facility: CLINIC | Age: 60
End: 2020-10-08
Payer: COMMERCIAL

## 2020-10-08 VITALS
DIASTOLIC BLOOD PRESSURE: 98 MMHG | HEART RATE: 62 BPM | SYSTOLIC BLOOD PRESSURE: 155 MMHG | WEIGHT: 279.56 LBS | BODY MASS INDEX: 34.76 KG/M2 | HEIGHT: 75 IN

## 2020-10-08 DIAGNOSIS — B35.1 ONYCHOMYCOSIS DUE TO DERMATOPHYTE: ICD-10-CM

## 2020-10-08 DIAGNOSIS — M20.42 HAMMER TOES OF BOTH FEET: ICD-10-CM

## 2020-10-08 DIAGNOSIS — L84 CORN OR CALLUS: ICD-10-CM

## 2020-10-08 DIAGNOSIS — E11.9 COMPREHENSIVE DIABETIC FOOT EXAMINATION, TYPE 2 DM, ENCOUNTER FOR: Primary | ICD-10-CM

## 2020-10-08 DIAGNOSIS — M20.41 HAMMER TOES OF BOTH FEET: ICD-10-CM

## 2020-10-08 PROCEDURE — 3008F BODY MASS INDEX DOCD: CPT | Mod: CPTII,S$GLB,, | Performed by: PODIATRIST

## 2020-10-08 PROCEDURE — 3008F PR BODY MASS INDEX (BMI) DOCUMENTED: ICD-10-PCS | Mod: CPTII,S$GLB,, | Performed by: PODIATRIST

## 2020-10-08 PROCEDURE — 3046F PR MOST RECENT HEMOGLOBIN A1C LEVEL > 9.0%: ICD-10-PCS | Mod: CPTII,S$GLB,, | Performed by: PODIATRIST

## 2020-10-08 PROCEDURE — 3046F HEMOGLOBIN A1C LEVEL >9.0%: CPT | Mod: CPTII,S$GLB,, | Performed by: PODIATRIST

## 2020-10-08 PROCEDURE — 99213 OFFICE O/P EST LOW 20 MIN: CPT | Mod: S$GLB,,, | Performed by: PODIATRIST

## 2020-10-08 PROCEDURE — 99999 PR PBB SHADOW E&M-EST. PATIENT-LVL IV: CPT | Mod: PBBFAC,,, | Performed by: PODIATRIST

## 2020-10-08 PROCEDURE — 99999 PR PBB SHADOW E&M-EST. PATIENT-LVL IV: ICD-10-PCS | Mod: PBBFAC,,, | Performed by: PODIATRIST

## 2020-10-08 PROCEDURE — 99213 PR OFFICE/OUTPT VISIT, EST, LEVL III, 20-29 MIN: ICD-10-PCS | Mod: S$GLB,,, | Performed by: PODIATRIST

## 2020-10-08 NOTE — PATIENT INSTRUCTIONS
2. Supportive shoes at all times (athletic shoe including thurston, new balance, asics, HOKA or casual shoes like Dansko, Vikki, Naot, Vionoic, Fit flop  clog or wedge with extra heel padding and arch support. For house slippers would recommend Fitflop or Spenco found on amazon.com, Never walk barefoot or in flats.    (Varsity sports, Phidippides, Lifestreams company, Masseys, Goodfeet, Cantilever, Feet First, Foot Solutions, Therapeutic shoes, SAS, [facility] pro shop) http://www.netFactor.com/    3. Orthotic (recommend the following brands: Superfeet, Spenco, Powerstep, Sof Sole Fit Series)

## 2020-10-08 NOTE — PROGRESS NOTES
Subjective:      Patient ID: Nuno Tejeda is a 59 y.o. male.    Chief Complaint: Diabetic Foot Exam (PCP:  Dr Farnaz Springer Dec 2019; HgbA1c:  5/15/20  10.1) and Callouses    Nuno is a 59 y.o. male who presents to the clinic upon referral from Dr. Brunson  for evaluation and treatment of diabetic feet. Nuno has a past medical history of Cataract, Diabetes mellitus, Diabetes mellitus, type 2, Glaucoma, and Psoriasis. Patient relates no major problem with feet. Only complaints today consist of Diabetes, increased risk amputation needing evaluation/management/optomization of foot care. No acute changes since he saw Dr. Marques last year    CC2 thickl hard callus plantar right foot and plantar distal left fourth toe.  Gradual onset, worsening over past several weeks, aggravated by increased weight bearing, shoe gear, pressure.  No previous medical treatment.  OTC med not helping. Denies trauma, surgery.    PCP: [facility]    Date Last Seen by PCP:   Chief Complaint   Patient presents with    Diabetic Foot Exam     PCP:  Dr Farnaz Springer Dec 2019; HgbA1c:  5/15/20  10.1    [facility]         Current shoe gear: Casual shoes    Hemoglobin A1C   Date Value Ref Range Status   05/15/2020 10.1 (H) 4.7 - 5.6 % Final             Review of Systems   Constitution: Negative for chills, diaphoresis, fever, malaise/fatigue and night sweats.   Cardiovascular: Negative for claudication, cyanosis, leg swelling and syncope.   Skin: Positive for nail changes and suspicious lesions. Negative for color change, dry skin, rash and unusual hair distribution.   Musculoskeletal: Negative for falls, joint pain, joint swelling, muscle cramps, muscle weakness and stiffness.   Gastrointestinal: Negative for constipation, diarrhea, nausea and vomiting.   Neurological: Positive for sensory change. Negative for brief paralysis, disturbances in coordination, focal weakness, numbness, paresthesias and tremors.            Objective:       Physical Exam  Constitutional:       General: He is not in acute distress.     Appearance: He is well-developed. He is not diaphoretic.   Cardiovascular:      Pulses:           Dorsalis pedis pulses are 2+ on the right side and 2+ on the left side.        Posterior tibial pulses are 2+ on the right side and 2+ on the left side.      Comments: Capillary refill 3 seconds all toes/distal feet, all toes/both feet warm to touch.      Negavie lower extremity edema bilateral.    Musculoskeletal:      Right ankle: He exhibits normal range of motion, no swelling, no ecchymosis, no deformity, no laceration and normal pulse. Achilles tendon normal. Achilles tendon exhibits no pain, no defect and normal Patricia's test results.      Comments: ;Patient has hammertoes of digits   2-5 bilateral                partially reducible without symptom today.    Otherwise, Normal angle, base, station of gait. All ten toes without clubbing, cyanosis, or signs of ischemia.  No pain to palpation bilateral lower extremities.  Range of motion, stability, muscle strength, and muscle tone normal bilateral feet and legs.        Feet:      Right foot:      Protective Sensation: 10 sites tested. 10 sites sensed.      Left foot:      Protective Sensation: 10 sites tested. 10 sites sensed.   Lymphadenopathy:      Lower Body: No right inguinal adenopathy. No left inguinal adenopathy.      Comments: Negative lymphadenopathy bilateral popliteal fossa and tarsal tunnel.    Negative lymphangitic streaking bilateral feet/ankles/legs.   Skin:     General: Skin is warm and dry.      Capillary Refill: Capillary refill takes 2 to 3 seconds.      Coloration: Skin is not pale.      Findings: Lesion present. No abrasion, bruising, burn, ecchymosis, erythema, laceration or rash.      Nails: There is no clubbing.        Comments:   Focal hyperkeratotic lesion consisting entirely of hyperkeratotic tissue without open skin, drainage, pus,  fluctuance, malodor, or signs of infection plantar right second metatarsal head and left distal fourth toe.    Toenails 1st, 2nd, 3rd, 4th, 5th  bilateral are hypertrophic thickened 2-3 mm, dystrophic, discolored tanish brown with tan, gray crumbly subungual debris.  Tender to distal nail plate pressure, without periungual skin abnormality of each.  Otherwise, Skin is normal age and health appropriate color, turgor, texture, and temperature bilateral lower extremities without ulceration, hyperpigmentation, discoloration, masses nodules or cords palpated.  No ecchymosis, erythema, edema, or cardinal signs of infection bilateral lower extremities.     Neurological:      Mental Status: He is alert and oriented to person, place, and time.      Sensory: No sensory deficit.      Motor: No tremor, atrophy or abnormal muscle tone.      Gait: Gait normal.      Comments: Negative tinel sign to percussion sural, superficial peroneal, deep peroneal, saphenous, and posterior tibial nerves right and left ankles and feet.       Psychiatric:         Behavior: Behavior is cooperative.               Assessment:       Encounter Diagnoses   Name Primary?    Comprehensive diabetic foot examination, type 2 DM, encounter for Yes    Onychomycosis due to dermatophyte     Corn or callus     Hammer toes of both feet          Plan:       Nuno was seen today for diabetic foot exam and callouses.    Diagnoses and all orders for this visit:    Comprehensive diabetic foot examination, type 2 DM, encounter for    Onychomycosis due to dermatophyte    Corn or callus    Hammer toes of both feet      I counseled the patient on his conditions, their implications and medical management.        - Shoe inspection. Diabetic Foot Education. Patient reminded of the importance of good nutrition and blood sugar control to help prevent podiatric complications of diabetes. Patient instructed on proper foot hygeine. We discussed wearing proper shoe gear,  daily foot inspections, never walking without protective shoe gear, never putting sharp instruments to feet, routine podiatric visits at least annually.    Discussed conservative treatment with shoes of adequate dimensions, material, and style to alleviate symptoms and delay or prevent surgical intervention.    For the calluses:  Patient will obtain over the counter arch supports and wear them in shoes whenever possible.  Athletic shoes intended for walking or running are usually best.    Patient will stretch the tendo achilles complex three times daily as demonstrated in the office.  Literature was dispensed illustrating proper stretching technique.    Pumice stone to callus areas after bathing    Return for annual diabetic foot exam    Perry Weldon DPM

## 2020-10-20 ENCOUNTER — OFFICE VISIT (OUTPATIENT)
Dept: URGENT CARE | Facility: CLINIC | Age: 60
End: 2020-10-20
Payer: COMMERCIAL

## 2020-10-20 VITALS
BODY MASS INDEX: 34.69 KG/M2 | HEIGHT: 75 IN | OXYGEN SATURATION: 99 % | TEMPERATURE: 98 F | SYSTOLIC BLOOD PRESSURE: 148 MMHG | RESPIRATION RATE: 18 BRPM | WEIGHT: 279 LBS | HEART RATE: 68 BPM | DIASTOLIC BLOOD PRESSURE: 91 MMHG

## 2020-10-20 DIAGNOSIS — I88.9 CERVICAL LYMPHADENITIS: Primary | ICD-10-CM

## 2020-10-20 PROCEDURE — 99214 PR OFFICE/OUTPT VISIT, EST, LEVL IV, 30-39 MIN: ICD-10-PCS | Mod: S$GLB,,, | Performed by: PHYSICIAN ASSISTANT

## 2020-10-20 PROCEDURE — 99214 OFFICE O/P EST MOD 30 MIN: CPT | Mod: S$GLB,,, | Performed by: PHYSICIAN ASSISTANT

## 2020-10-20 RX ORDER — DOXYCYCLINE HYCLATE 100 MG
100 TABLET ORAL 2 TIMES DAILY
Qty: 20 TABLET | Refills: 0 | Status: SHIPPED | OUTPATIENT
Start: 2020-10-20 | End: 2020-10-30

## 2020-10-20 NOTE — PROGRESS NOTES
"Subjective:       Patient ID: Nuno Tejeda is a 59 y.o. male.    Vitals:  height is 6' 3" (1.905 m) and weight is 126.6 kg (279 lb). His temperature is 97.6 °F (36.4 °C). His blood pressure is 148/91 (abnormal) and his pulse is 68. His respiration is 18 and oxygen saturation is 99%.     Chief Complaint: Mass (Lump on left side of neck)    Patient presents to clinic with a lump on the left side of his neck x 2 days. He states that it hurts to move his neck.    Mass  This is a new problem. The current episode started in the past 7 days. The problem occurs constantly. The problem has been unchanged. Associated symptoms include neck pain. Pertinent negatives include no abdominal pain, anorexia, arthralgias, change in bowel habit, chest pain, chills, congestion, coughing, diaphoresis, fatigue, fever, headaches, joint swelling, myalgias, nausea, numbness, rash, sore throat, swollen glands, urinary symptoms, vertigo, visual change, vomiting or weakness. Nothing aggravates the symptoms. He has tried acetaminophen for the symptoms. The treatment provided moderate relief.       Constitution: Negative for chills, sweating, fatigue and fever.   HENT: Negative for congestion and sore throat.    Neck: Positive for neck pain. Negative for painful lymph nodes.   Cardiovascular: Negative for chest pain and leg swelling.   Eyes: Negative for double vision and blurred vision.   Respiratory: Negative for cough and shortness of breath.    Gastrointestinal: Negative for abdominal pain, nausea, vomiting and diarrhea.   Genitourinary: Negative for dysuria, frequency and urgency.   Musculoskeletal: Positive for pain. Negative for joint pain, joint swelling, muscle cramps and muscle ache.   Skin: Negative for color change, pale and rash.   Allergic/Immunologic: Negative for seasonal allergies.   Neurological: Negative for dizziness, history of vertigo, light-headedness, passing out, headaches and numbness.   Hematologic/Lymphatic: " Negative for swollen lymph nodes, easy bruising/bleeding and history of blood clots. Does not bruise/bleed easily.   Psychiatric/Behavioral: Negative for nervous/anxious, sleep disturbance and depression. The patient is not nervous/anxious.        Objective:      Physical Exam   Constitutional: He is oriented to person, place, and time. He appears well-developed. He is cooperative.  Non-toxic appearance. He does not appear ill. No distress.   HENT:   Head: Normocephalic and atraumatic.       Ears:   Right Ear: Hearing, tympanic membrane, external ear and ear canal normal.   Left Ear: Hearing, tympanic membrane, external ear and ear canal normal.   Nose: Nose normal. No mucosal edema, rhinorrhea or nasal deformity. No epistaxis. Right sinus exhibits no maxillary sinus tenderness and no frontal sinus tenderness. Left sinus exhibits no maxillary sinus tenderness and no frontal sinus tenderness.   Mouth/Throat: Uvula is midline, oropharynx is clear and moist and mucous membranes are normal. No trismus in the jaw. Normal dentition. No uvula swelling. No posterior oropharyngeal erythema.   Eyes: Conjunctivae and lids are normal. Right eye exhibits no discharge. Left eye exhibits no discharge. No scleral icterus.   Neck: Trachea normal, normal range of motion, full passive range of motion without pain and phonation normal. Neck supple.   Cardiovascular: Normal rate, regular rhythm, normal heart sounds and normal pulses.   Pulmonary/Chest: Effort normal and breath sounds normal. No respiratory distress.   Abdominal: Soft. Normal appearance and bowel sounds are normal. He exhibits no distension, no pulsatile midline mass and no mass. There is no abdominal tenderness.   Musculoskeletal: Normal range of motion.         General: No deformity.   Lymphadenopathy:     He has cervical adenopathy.        Left cervical: Posterior cervical adenopathy present.   Neurological: He is alert and oriented to person, place, and time. He  exhibits normal muscle tone. Coordination normal.   Skin: Skin is warm, dry, intact, not diaphoretic and not pale. Psychiatric: His speech is normal and behavior is normal. Judgment and thought content normal.   Nursing note and vitals reviewed.        Assessment:       1. Cervical lymphadenitis        Plan:         Cervical lymphadenitis  -     doxycycline (VIBRA-TABS) 100 MG tablet; Take 1 tablet (100 mg total) by mouth 2 (two) times daily. Take as directed until bottle is empty for 10 days  Dispense: 20 tablet; Refill: 0    All applicable EKG, medical records, labs, imaging reviewed in Epic and discussed with patient.   Patient Instructions     Please follow up with your PCP if this isn't better at the end of the antibiotic course.    Local Lymph Node Infection, Antibiotic Treatment    You have a bacterial infection of a lymph node. The lymph nodes are part of your immune system. They are found under the jaw and along the side of the neck, in the armpits, and in the groin. An infection or inflammation in nearby tissues causes the lymph nodes to swell and become tender.  When a bacterial infection occurs in the lymph node, it becomes very painful. The nearby skin gets red and warm. You may also have a fever.  Antibiotics and hot compresses are used to treat this infection. The pain and redness will get better over the next 7 to 10 days. Swelling may take several months to go away.  Sometimes an abscess (with pus) forms inside the lymph node. If this happens, antibiotics may not be enough to cure the infection. Minor surgery may be needed to drain the pus.  Home care  Follow these guidelines when caring for yourself at home:  · Take all of the antibiotic medicine exactly as prescribed until it is gone. Be careful not to miss any doses, especially during the first few days.  · Make a hot compress by running hot water over a face cloth. Apply it to the sore area until the cloth cools off. Repeat this for 20 minutes.  Use the hot compress 3 times a day for the first 3 days, or until the pain and redness begin to get better. The heat will increase the blood flow to the area and speed the healing process.  · You may use acetaminophen or ibuprofen to control pain and fever, unless another medicine was prescribed for this. Dont use ibuprofen in children under 6 months of age. If you have chronic liver or kidney disease, talk with your healthcare provider before using these medicines. Also talk with your provider if youve had a stomach ulcer or GI bleeding. Dont give aspirin to anyone under 18 years of age who is ill with a fever. It may cause severe liver damage.  Follow-up care  Follow up with your healthcare provider, or as advised, after you finish the antibiotics.  When to seek medical advice  Call your healthcare provider right away if any of these occur:  · Redness, swelling or pain in the lymph node gets worse  · The lymph node gets bigger, becomes soft in the middle, or doesnt seem to be getting better after youve taken the antibiotics for 2 days (48 hours)  · Pus or fluid drains from the lymph node  · You have difficulty breathing or swallowing  Also call your provider right away if you have a fever, or your childs provider if:  · Your child is younger than 12 weeks and has a fever of 100.4°F (38°C) or higher. Your baby may need to be seen by his or her healthcare provider.  · Your child is of any age and has repeated fevers above 104°F (40°C).  · Your child is younger than 2 years old and his or her fever continues for more than 24 hours. Or your child is 2 years old or older and his or her fever continues for more than 3 days.  Date Last Reviewed: 2/17/2015  © 8929-4551 [employer]. [address] [place] [address], [place] [address]. All rights reserved. This information is not intended as a substitute for professional medical care. Always follow your healthcare professional's instructions.

## 2020-10-20 NOTE — PATIENT INSTRUCTIONS
Please follow up with your PCP if this isn't better at the end of the antibiotic course.    Local Lymph Node Infection, Antibiotic Treatment    You have a bacterial infection of a lymph node. The lymph nodes are part of your immune system. They are found under the jaw and along the side of the neck, in the armpits, and in the groin. An infection or inflammation in nearby tissues causes the lymph nodes to swell and become tender.  When a bacterial infection occurs in the lymph node, it becomes very painful. The nearby skin gets red and warm. You may also have a fever.  Antibiotics and hot compresses are used to treat this infection. The pain and redness will get better over the next 7 to 10 days. Swelling may take several months to go away.  Sometimes an abscess (with pus) forms inside the lymph node. If this happens, antibiotics may not be enough to cure the infection. Minor surgery may be needed to drain the pus.  Home care  Follow these guidelines when caring for yourself at home:  · Take all of the antibiotic medicine exactly as prescribed until it is gone. Be careful not to miss any doses, especially during the first few days.  · Make a hot compress by running hot water over a face cloth. Apply it to the sore area until the cloth cools off. Repeat this for 20 minutes. Use the hot compress 3 times a day for the first 3 days, or until the pain and redness begin to get better. The heat will increase the blood flow to the area and speed the healing process.  · You may use acetaminophen or ibuprofen to control pain and fever, unless another medicine was prescribed for this. Dont use ibuprofen in children under 6 months of age. If you have chronic liver or kidney disease, talk with your healthcare provider before using these medicines. Also talk with your provider if youve had a stomach ulcer or GI bleeding. Dont give aspirin to anyone under 18 years of age who is ill with a fever. It may cause severe liver  damage.  Follow-up care  Follow up with your healthcare provider, or as advised, after you finish the antibiotics.  When to seek medical advice  Call your healthcare provider right away if any of these occur:  · Redness, swelling or pain in the lymph node gets worse  · The lymph node gets bigger, becomes soft in the middle, or doesnt seem to be getting better after youve taken the antibiotics for 2 days (48 hours)  · Pus or fluid drains from the lymph node  · You have difficulty breathing or swallowing  Also call your provider right away if you have a fever, or your childs provider if:  · Your child is younger than 12 weeks and has a fever of 100.4°F (38°C) or higher. Your baby may need to be seen by his or her healthcare provider.  · Your child is of any age and has repeated fevers above 104°F (40°C).  · Your child is younger than 2 years old and his or her fever continues for more than 24 hours. Or your child is 2 years old or older and his or her fever continues for more than 3 days.  Date Last Reviewed: 2/17/2015 © 2000-2017 [employer]. [address] [place] [id number]. All rights reserved. This information is not intended as a substitute for professional medical care. Always follow your healthcare professional's instructions.

## 2021-02-04 ENCOUNTER — OFFICE VISIT (OUTPATIENT)
Dept: OPHTHALMOLOGY | Facility: CLINIC | Age: 61
End: 2021-02-04
Payer: COMMERCIAL

## 2021-02-04 ENCOUNTER — CLINICAL SUPPORT (OUTPATIENT)
Dept: OPHTHALMOLOGY | Facility: CLINIC | Age: 61
End: 2021-02-04
Payer: COMMERCIAL

## 2021-02-04 DIAGNOSIS — H52.4 MYOPIA OF BOTH EYES WITH ASTIGMATISM AND PRESBYOPIA: ICD-10-CM

## 2021-02-04 DIAGNOSIS — H35.54 RPE (RETINAL PIGMENT EPITHELIUM) HYPERTROPHY: ICD-10-CM

## 2021-02-04 DIAGNOSIS — H40.023 OPEN ANGLE WITH BORDERLINE FINDINGS AND HIGH GLAUCOMA RISK IN BOTH EYES: Primary | ICD-10-CM

## 2021-02-04 DIAGNOSIS — H52.13 MYOPIA OF BOTH EYES WITH ASTIGMATISM AND PRESBYOPIA: ICD-10-CM

## 2021-02-04 DIAGNOSIS — H25.13 NUCLEAR SCLEROSIS OF BOTH EYES: ICD-10-CM

## 2021-02-04 DIAGNOSIS — Z86.19 HISTORY OF SHINGLES: ICD-10-CM

## 2021-02-04 DIAGNOSIS — H52.203 MYOPIA OF BOTH EYES WITH ASTIGMATISM AND PRESBYOPIA: ICD-10-CM

## 2021-02-04 DIAGNOSIS — B02.39 DERMATITIS OF EYELID OF LEFT EYE DUE TO HERPES ZOSTER: ICD-10-CM

## 2021-02-04 DIAGNOSIS — H35.50 HEREDITARY RETINAL DYSTROPHY: ICD-10-CM

## 2021-02-04 PROCEDURE — 92014 PR EYE EXAM, EST PATIENT,COMPREHESV: ICD-10-PCS | Mod: S$GLB,,, | Performed by: OPHTHALMOLOGY

## 2021-02-04 PROCEDURE — 92133 POSTERIOR SEGMENT OCT OPTIC NERVE(OCULAR COHERENCE TOMOGRAPHY) - OU - BOTH EYES: ICD-10-PCS | Mod: S$GLB,,, | Performed by: OPHTHALMOLOGY

## 2021-02-04 PROCEDURE — 99999 PR PBB SHADOW E&M-EST. PATIENT-LVL II: CPT | Mod: PBBFAC,,, | Performed by: OPHTHALMOLOGY

## 2021-02-04 PROCEDURE — 92133 CPTRZD OPH DX IMG PST SGM ON: CPT | Mod: S$GLB,,, | Performed by: OPHTHALMOLOGY

## 2021-02-04 PROCEDURE — 92083 HUMPHREY VISUAL FIELD - OU - BOTH EYES: ICD-10-PCS | Mod: S$GLB,,, | Performed by: OPHTHALMOLOGY

## 2021-02-04 PROCEDURE — 92083 EXTENDED VISUAL FIELD XM: CPT | Mod: S$GLB,,, | Performed by: OPHTHALMOLOGY

## 2021-02-04 PROCEDURE — 1126F PR PAIN SEVERITY QUANTIFIED, NO PAIN PRESENT: ICD-10-PCS | Mod: S$GLB,,, | Performed by: OPHTHALMOLOGY

## 2021-02-04 PROCEDURE — 1126F AMNT PAIN NOTED NONE PRSNT: CPT | Mod: S$GLB,,, | Performed by: OPHTHALMOLOGY

## 2021-02-04 PROCEDURE — 99999 PR PBB SHADOW E&M-EST. PATIENT-LVL II: ICD-10-PCS | Mod: PBBFAC,,, | Performed by: OPHTHALMOLOGY

## 2021-02-04 PROCEDURE — 92014 COMPRE OPH EXAM EST PT 1/>: CPT | Mod: S$GLB,,, | Performed by: OPHTHALMOLOGY

## 2021-02-04 RX ORDER — LATANOPROST 50 UG/ML
1 SOLUTION/ DROPS OPHTHALMIC DAILY
Qty: 7.5 ML | Refills: 3 | Status: SHIPPED | OUTPATIENT
Start: 2021-02-04 | End: 2021-05-12 | Stop reason: SDUPTHER

## 2021-04-13 ENCOUNTER — OFFICE VISIT (OUTPATIENT)
Dept: OPTOMETRY | Facility: CLINIC | Age: 61
End: 2021-04-13
Payer: COMMERCIAL

## 2021-04-13 DIAGNOSIS — H52.203 ASTIGMATISM OF BOTH EYES, UNSPECIFIED TYPE: Primary | ICD-10-CM

## 2021-04-13 DIAGNOSIS — H35.50 HEREDITARY RETINAL DYSTROPHY: ICD-10-CM

## 2021-04-13 DIAGNOSIS — B02.39 DERMATITIS OF EYELID OF LEFT EYE DUE TO HERPES ZOSTER: ICD-10-CM

## 2021-04-13 DIAGNOSIS — H40.023 OAG (OPEN ANGLE GLAUCOMA) SUSPECT, HIGH RISK, BILATERAL: ICD-10-CM

## 2021-04-13 PROCEDURE — 92002 PR EYE EXAM, NEW PATIENT,INTERMED: ICD-10-PCS | Mod: S$GLB,,, | Performed by: OPTOMETRIST

## 2021-04-13 PROCEDURE — 99999 PR PBB SHADOW E&M-EST. PATIENT-LVL III: CPT | Mod: PBBFAC,,, | Performed by: OPTOMETRIST

## 2021-04-13 PROCEDURE — 92002 INTRM OPH EXAM NEW PATIENT: CPT | Mod: S$GLB,,, | Performed by: OPTOMETRIST

## 2021-04-13 PROCEDURE — 92015 PR REFRACTION: ICD-10-PCS | Mod: S$GLB,,, | Performed by: OPTOMETRIST

## 2021-04-13 PROCEDURE — 99999 PR PBB SHADOW E&M-EST. PATIENT-LVL III: ICD-10-PCS | Mod: PBBFAC,,, | Performed by: OPTOMETRIST

## 2021-04-13 PROCEDURE — 92015 DETERMINE REFRACTIVE STATE: CPT | Mod: S$GLB,,, | Performed by: OPTOMETRIST

## 2021-04-16 ENCOUNTER — PATIENT MESSAGE (OUTPATIENT)
Dept: RESEARCH | Facility: HOSPITAL | Age: 61
End: 2021-04-16

## 2021-10-05 ENCOUNTER — OFFICE VISIT (OUTPATIENT)
Dept: OPHTHALMOLOGY | Facility: CLINIC | Age: 61
End: 2021-10-05
Payer: COMMERCIAL

## 2021-10-05 DIAGNOSIS — H40.023 OPEN ANGLE WITH BORDERLINE FINDINGS AND HIGH GLAUCOMA RISK IN BOTH EYES: Primary | ICD-10-CM

## 2021-10-05 DIAGNOSIS — H25.13 NUCLEAR SCLEROSIS OF BOTH EYES: ICD-10-CM

## 2021-10-05 DIAGNOSIS — H35.50 HEREDITARY RETINAL DYSTROPHY: ICD-10-CM

## 2021-10-05 DIAGNOSIS — H35.54 RPE (RETINAL PIGMENT EPITHELIUM) HYPERTROPHY: ICD-10-CM

## 2021-10-05 DIAGNOSIS — B02.39 DERMATITIS OF EYELID OF LEFT EYE DUE TO HERPES ZOSTER: ICD-10-CM

## 2021-10-05 DIAGNOSIS — Z86.19 HISTORY OF SHINGLES: ICD-10-CM

## 2021-10-05 PROCEDURE — 1159F PR MEDICATION LIST DOCUMENTED IN MEDICAL RECORD: ICD-10-PCS | Mod: CPTII,S$GLB,, | Performed by: OPHTHALMOLOGY

## 2021-10-05 PROCEDURE — 1160F PR REVIEW ALL MEDS BY PRESCRIBER/CLIN PHARMACIST DOCUMENTED: ICD-10-PCS | Mod: CPTII,S$GLB,, | Performed by: OPHTHALMOLOGY

## 2021-10-05 PROCEDURE — 92020 GONIOSCOPY: CPT | Mod: S$GLB,,, | Performed by: OPHTHALMOLOGY

## 2021-10-05 PROCEDURE — 1160F RVW MEDS BY RX/DR IN RCRD: CPT | Mod: CPTII,S$GLB,, | Performed by: OPHTHALMOLOGY

## 2021-10-05 PROCEDURE — 1159F MED LIST DOCD IN RCRD: CPT | Mod: CPTII,S$GLB,, | Performed by: OPHTHALMOLOGY

## 2021-10-05 PROCEDURE — 92012 PR EYE EXAM, EST PATIENT,INTERMED: ICD-10-PCS | Mod: S$GLB,,, | Performed by: OPHTHALMOLOGY

## 2021-10-05 PROCEDURE — 92012 INTRM OPH EXAM EST PATIENT: CPT | Mod: S$GLB,,, | Performed by: OPHTHALMOLOGY

## 2021-10-05 PROCEDURE — 99999 PR PBB SHADOW E&M-EST. PATIENT-LVL II: ICD-10-PCS | Mod: PBBFAC,,, | Performed by: OPHTHALMOLOGY

## 2021-10-05 PROCEDURE — 99999 PR PBB SHADOW E&M-EST. PATIENT-LVL II: CPT | Mod: PBBFAC,,, | Performed by: OPHTHALMOLOGY

## 2021-10-05 PROCEDURE — 92020 PR SPECIAL EYE EVAL,GONIOSCOPY: ICD-10-PCS | Mod: S$GLB,,, | Performed by: OPHTHALMOLOGY

## 2021-10-05 RX ORDER — LATANOPROST 50 UG/ML
1 SOLUTION/ DROPS OPHTHALMIC DAILY
Qty: 7.5 ML | Refills: 3 | Status: SHIPPED | OUTPATIENT
Start: 2021-10-05 | End: 2022-12-27 | Stop reason: SDUPTHER

## 2022-02-15 ENCOUNTER — CLINICAL SUPPORT (OUTPATIENT)
Dept: OPHTHALMOLOGY | Facility: CLINIC | Age: 62
End: 2022-02-15
Payer: COMMERCIAL

## 2022-02-15 ENCOUNTER — OFFICE VISIT (OUTPATIENT)
Dept: OPHTHALMOLOGY | Facility: CLINIC | Age: 62
End: 2022-02-15
Payer: COMMERCIAL

## 2022-02-15 DIAGNOSIS — H53.40 VISUAL FIELD DEFECTS: ICD-10-CM

## 2022-02-15 DIAGNOSIS — H40.023 OPEN ANGLE WITH BORDERLINE FINDINGS AND HIGH GLAUCOMA RISK IN BOTH EYES: Primary | ICD-10-CM

## 2022-02-15 DIAGNOSIS — H35.50 HEREDITARY RETINAL DYSTROPHY: ICD-10-CM

## 2022-02-15 DIAGNOSIS — H52.4 MYOPIA OF BOTH EYES WITH ASTIGMATISM AND PRESBYOPIA: ICD-10-CM

## 2022-02-15 DIAGNOSIS — H25.13 NUCLEAR SCLEROSIS OF BOTH EYES: ICD-10-CM

## 2022-02-15 DIAGNOSIS — H52.203 MYOPIA OF BOTH EYES WITH ASTIGMATISM AND PRESBYOPIA: ICD-10-CM

## 2022-02-15 DIAGNOSIS — H52.13 MYOPIA OF BOTH EYES WITH ASTIGMATISM AND PRESBYOPIA: ICD-10-CM

## 2022-02-15 PROCEDURE — 92083 EXTENDED VISUAL FIELD XM: CPT | Mod: S$GLB,,, | Performed by: OPHTHALMOLOGY

## 2022-02-15 PROCEDURE — 1160F PR REVIEW ALL MEDS BY PRESCRIBER/CLIN PHARMACIST DOCUMENTED: ICD-10-PCS | Mod: CPTII,S$GLB,, | Performed by: OPHTHALMOLOGY

## 2022-02-15 PROCEDURE — 92014 PR EYE EXAM, EST PATIENT,COMPREHESV: ICD-10-PCS | Mod: S$GLB,,, | Performed by: OPHTHALMOLOGY

## 2022-02-15 PROCEDURE — 92133 CPTRZD OPH DX IMG PST SGM ON: CPT | Mod: S$GLB,,, | Performed by: OPHTHALMOLOGY

## 2022-02-15 PROCEDURE — 92133 POSTERIOR SEGMENT OCT OPTIC NERVE(OCULAR COHERENCE TOMOGRAPHY) - OU - BOTH EYES: ICD-10-PCS | Mod: S$GLB,,, | Performed by: OPHTHALMOLOGY

## 2022-02-15 PROCEDURE — 1159F MED LIST DOCD IN RCRD: CPT | Mod: CPTII,S$GLB,, | Performed by: OPHTHALMOLOGY

## 2022-02-15 PROCEDURE — 92083 HUMPHREY VISUAL FIELD - OU - BOTH EYES: ICD-10-PCS | Mod: S$GLB,,, | Performed by: OPHTHALMOLOGY

## 2022-02-15 PROCEDURE — 99999 PR PBB SHADOW E&M-EST. PATIENT-LVL III: CPT | Mod: PBBFAC,,, | Performed by: OPHTHALMOLOGY

## 2022-02-15 PROCEDURE — 99999 PR PBB SHADOW E&M-EST. PATIENT-LVL III: ICD-10-PCS | Mod: PBBFAC,,, | Performed by: OPHTHALMOLOGY

## 2022-02-15 PROCEDURE — 92014 COMPRE OPH EXAM EST PT 1/>: CPT | Mod: S$GLB,,, | Performed by: OPHTHALMOLOGY

## 2022-02-15 PROCEDURE — 1160F RVW MEDS BY RX/DR IN RCRD: CPT | Mod: CPTII,S$GLB,, | Performed by: OPHTHALMOLOGY

## 2022-02-15 PROCEDURE — 1159F PR MEDICATION LIST DOCUMENTED IN MEDICAL RECORD: ICD-10-PCS | Mod: CPTII,S$GLB,, | Performed by: OPHTHALMOLOGY
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
## 2022-02-16 NOTE — PROGRESS NOTES
HVF done. Rel/Fix/coop. Good ou./chart checked for latex allergy./-2.75 + 2.00 x 110/od -2.00 + 1.50 x 60/os-smh

## 2022-12-27 DIAGNOSIS — H40.023 OPEN ANGLE WITH BORDERLINE FINDINGS AND HIGH GLAUCOMA RISK IN BOTH EYES: ICD-10-CM

## 2022-12-27 RX ORDER — LATANOPROST 50 UG/ML
1 SOLUTION/ DROPS OPHTHALMIC DAILY
Qty: 7.5 ML | Refills: 3 | Status: SHIPPED | OUTPATIENT
Start: 2022-12-27 | End: 2024-03-18

## 2023-01-05 ENCOUNTER — TELEPHONE (OUTPATIENT)
Dept: SPORTS MEDICINE | Facility: CLINIC | Age: 63
End: 2023-01-05

## 2023-01-05 DIAGNOSIS — M25.512 LEFT SHOULDER PAIN, UNSPECIFIED CHRONICITY: Primary | ICD-10-CM

## 2023-01-05 NOTE — TELEPHONE ENCOUNTER
Called and spoke with patient to let him know that we will need x-ray of her shoulder and to come in at 2:30.

## 2023-01-09 ENCOUNTER — OFFICE VISIT (OUTPATIENT)
Dept: SPORTS MEDICINE | Facility: CLINIC | Age: 63
End: 2023-01-09
Payer: COMMERCIAL

## 2023-01-09 ENCOUNTER — APPOINTMENT (OUTPATIENT)
Dept: RADIOLOGY | Facility: OTHER | Age: 63
End: 2023-01-09
Attending: PHYSICIAN ASSISTANT
Payer: COMMERCIAL

## 2023-01-09 VITALS
HEART RATE: 70 BPM | WEIGHT: 275.88 LBS | HEIGHT: 75 IN | SYSTOLIC BLOOD PRESSURE: 181 MMHG | DIASTOLIC BLOOD PRESSURE: 81 MMHG | BODY MASS INDEX: 34.3 KG/M2

## 2023-01-09 DIAGNOSIS — M25.512 ACUTE PAIN OF LEFT SHOULDER: ICD-10-CM

## 2023-01-09 DIAGNOSIS — M54.12 CERVICAL RADICULOPATHY: Primary | ICD-10-CM

## 2023-01-09 DIAGNOSIS — M25.512 LEFT SHOULDER PAIN, UNSPECIFIED CHRONICITY: ICD-10-CM

## 2023-01-09 PROCEDURE — 3077F SYST BP >= 140 MM HG: CPT | Mod: CPTII,S$GLB,, | Performed by: PHYSICIAN ASSISTANT

## 2023-01-09 PROCEDURE — 1159F MED LIST DOCD IN RCRD: CPT | Mod: CPTII,S$GLB,, | Performed by: PHYSICIAN ASSISTANT

## 2023-01-09 PROCEDURE — 3008F BODY MASS INDEX DOCD: CPT | Mod: CPTII,S$GLB,, | Performed by: PHYSICIAN ASSISTANT

## 2023-01-09 PROCEDURE — 1160F RVW MEDS BY RX/DR IN RCRD: CPT | Mod: CPTII,S$GLB,, | Performed by: PHYSICIAN ASSISTANT

## 2023-01-09 PROCEDURE — 3008F PR BODY MASS INDEX (BMI) DOCUMENTED: ICD-10-PCS | Mod: CPTII,S$GLB,, | Performed by: PHYSICIAN ASSISTANT

## 2023-01-09 PROCEDURE — 73030 X-RAY EXAM OF SHOULDER: CPT | Mod: TC,LT

## 2023-01-09 PROCEDURE — 3079F DIAST BP 80-89 MM HG: CPT | Mod: CPTII,S$GLB,, | Performed by: PHYSICIAN ASSISTANT

## 2023-01-09 PROCEDURE — 73030 XR SHOULDER COMPLETE 2 OR MORE VIEWS LEFT: ICD-10-PCS | Mod: 26,LT,, | Performed by: RADIOLOGY

## 2023-01-09 PROCEDURE — 73030 X-RAY EXAM OF SHOULDER: CPT | Mod: 26,LT,, | Performed by: RADIOLOGY

## 2023-01-09 PROCEDURE — 99999 PR PBB SHADOW E&M-EST. PATIENT-LVL III: CPT | Mod: PBBFAC,,, | Performed by: PHYSICIAN ASSISTANT

## 2023-01-09 PROCEDURE — 99999 PR PBB SHADOW E&M-EST. PATIENT-LVL III: ICD-10-PCS | Mod: PBBFAC,,, | Performed by: PHYSICIAN ASSISTANT

## 2023-01-09 PROCEDURE — 3079F PR MOST RECENT DIASTOLIC BLOOD PRESSURE 80-89 MM HG: ICD-10-PCS | Mod: CPTII,S$GLB,, | Performed by: PHYSICIAN ASSISTANT

## 2023-01-09 PROCEDURE — 1159F PR MEDICATION LIST DOCUMENTED IN MEDICAL RECORD: ICD-10-PCS | Mod: CPTII,S$GLB,, | Performed by: PHYSICIAN ASSISTANT

## 2023-01-09 PROCEDURE — 99203 OFFICE O/P NEW LOW 30 MIN: CPT | Mod: S$GLB,,, | Performed by: PHYSICIAN ASSISTANT

## 2023-01-09 PROCEDURE — 99203 PR OFFICE/OUTPT VISIT, NEW, LEVL III, 30-44 MIN: ICD-10-PCS | Mod: S$GLB,,, | Performed by: PHYSICIAN ASSISTANT

## 2023-01-09 PROCEDURE — 1160F PR REVIEW ALL MEDS BY PRESCRIBER/CLIN PHARMACIST DOCUMENTED: ICD-10-PCS | Mod: CPTII,S$GLB,, | Performed by: PHYSICIAN ASSISTANT

## 2023-01-09 PROCEDURE — 3077F PR MOST RECENT SYSTOLIC BLOOD PRESSURE >= 140 MM HG: ICD-10-PCS | Mod: CPTII,S$GLB,, | Performed by: PHYSICIAN ASSISTANT

## 2023-01-09 RX ORDER — METHYLPREDNISOLONE 4 MG/1
TABLET ORAL
Qty: 21 EACH | Refills: 0 | Status: SHIPPED | OUTPATIENT
Start: 2023-01-09 | End: 2023-01-30

## 2023-01-09 RX ORDER — GABAPENTIN 300 MG/1
300 CAPSULE ORAL 3 TIMES DAILY PRN
Qty: 30 CAPSULE | Refills: 0 | Status: SHIPPED | OUTPATIENT
Start: 2023-01-09 | End: 2023-05-24 | Stop reason: SDUPTHER
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
## 2023-02-20 ENCOUNTER — OFFICE VISIT (OUTPATIENT)
Dept: SPORTS MEDICINE | Facility: CLINIC | Age: 63
End: 2023-02-20
Payer: COMMERCIAL

## 2023-02-20 ENCOUNTER — TELEPHONE (OUTPATIENT)
Dept: SPORTS MEDICINE | Facility: CLINIC | Age: 63
End: 2023-02-20

## 2023-02-20 ENCOUNTER — APPOINTMENT (OUTPATIENT)
Dept: RADIOLOGY | Facility: OTHER | Age: 63
End: 2023-02-20
Attending: PHYSICIAN ASSISTANT
Payer: COMMERCIAL

## 2023-02-20 ENCOUNTER — PATIENT MESSAGE (OUTPATIENT)
Dept: SPORTS MEDICINE | Facility: CLINIC | Age: 63
End: 2023-02-20

## 2023-02-20 VITALS
BODY MASS INDEX: 34.19 KG/M2 | WEIGHT: 275 LBS | DIASTOLIC BLOOD PRESSURE: 82 MMHG | HEIGHT: 75 IN | SYSTOLIC BLOOD PRESSURE: 138 MMHG

## 2023-02-20 DIAGNOSIS — M54.2 NECK PAIN: ICD-10-CM

## 2023-02-20 DIAGNOSIS — M48.02 SPINAL STENOSIS, CERVICAL REGION: ICD-10-CM

## 2023-02-20 DIAGNOSIS — M54.2 NECK PAIN: Primary | ICD-10-CM

## 2023-02-20 DIAGNOSIS — M54.12 CERVICAL RADICULOPATHY: Primary | ICD-10-CM

## 2023-02-20 DIAGNOSIS — M54.2 CERVICALGIA: ICD-10-CM

## 2023-02-20 PROCEDURE — 1160F RVW MEDS BY RX/DR IN RCRD: CPT | Mod: CPTII,S$GLB,, | Performed by: PHYSICIAN ASSISTANT

## 2023-02-20 PROCEDURE — 1159F MED LIST DOCD IN RCRD: CPT | Mod: CPTII,S$GLB,, | Performed by: PHYSICIAN ASSISTANT

## 2023-02-20 PROCEDURE — 3079F DIAST BP 80-89 MM HG: CPT | Mod: CPTII,S$GLB,, | Performed by: PHYSICIAN ASSISTANT

## 2023-02-20 PROCEDURE — 1160F PR REVIEW ALL MEDS BY PRESCRIBER/CLIN PHARMACIST DOCUMENTED: ICD-10-PCS | Mod: CPTII,S$GLB,, | Performed by: PHYSICIAN ASSISTANT

## 2023-02-20 PROCEDURE — 3008F BODY MASS INDEX DOCD: CPT | Mod: CPTII,S$GLB,, | Performed by: PHYSICIAN ASSISTANT

## 2023-02-20 PROCEDURE — 99999 PR PBB SHADOW E&M-EST. PATIENT-LVL IV: CPT | Mod: PBBFAC,,, | Performed by: PHYSICIAN ASSISTANT

## 2023-02-20 PROCEDURE — 99213 OFFICE O/P EST LOW 20 MIN: CPT | Mod: S$GLB,,, | Performed by: PHYSICIAN ASSISTANT

## 2023-02-20 PROCEDURE — 3008F PR BODY MASS INDEX (BMI) DOCUMENTED: ICD-10-PCS | Mod: CPTII,S$GLB,, | Performed by: PHYSICIAN ASSISTANT

## 2023-02-20 PROCEDURE — 3079F PR MOST RECENT DIASTOLIC BLOOD PRESSURE 80-89 MM HG: ICD-10-PCS | Mod: CPTII,S$GLB,, | Performed by: PHYSICIAN ASSISTANT

## 2023-02-20 PROCEDURE — 1159F PR MEDICATION LIST DOCUMENTED IN MEDICAL RECORD: ICD-10-PCS | Mod: CPTII,S$GLB,, | Performed by: PHYSICIAN ASSISTANT

## 2023-02-20 PROCEDURE — 99213 PR OFFICE/OUTPT VISIT, EST, LEVL III, 20-29 MIN: ICD-10-PCS | Mod: S$GLB,,, | Performed by: PHYSICIAN ASSISTANT

## 2023-02-20 PROCEDURE — 3075F SYST BP GE 130 - 139MM HG: CPT | Mod: CPTII,S$GLB,, | Performed by: PHYSICIAN ASSISTANT

## 2023-02-20 PROCEDURE — 99999 PR PBB SHADOW E&M-EST. PATIENT-LVL IV: ICD-10-PCS | Mod: PBBFAC,,, | Performed by: PHYSICIAN ASSISTANT

## 2023-02-20 PROCEDURE — 3075F PR MOST RECENT SYSTOLIC BLOOD PRESS GE 130-139MM HG: ICD-10-PCS | Mod: CPTII,S$GLB,, | Performed by: PHYSICIAN ASSISTANT

## 2023-02-20 PROCEDURE — 70360 X-RAY EXAM OF NECK: CPT | Mod: 26,,, | Performed by: RADIOLOGY

## 2023-02-20 PROCEDURE — 70360 X-RAY EXAM OF NECK: CPT | Mod: TC

## 2023-02-20 PROCEDURE — 70360 XR NECK SOFT TISSUE: ICD-10-PCS | Mod: 26,,, | Performed by: RADIOLOGY

## 2023-02-20 RX ORDER — CELECOXIB 200 MG/1
200 CAPSULE ORAL DAILY PRN
Qty: 30 CAPSULE | Refills: 0 | Status: SHIPPED | OUTPATIENT
Start: 2023-02-20 | End: 2023-05-24 | Stop reason: SDUPTHER

## 2023-02-20 RX ORDER — TRAMADOL HYDROCHLORIDE 50 MG/1
50 TABLET ORAL EVERY 8 HOURS PRN
Qty: 10 TABLET | Refills: 0 | Status: SHIPPED | OUTPATIENT
Start: 2023-02-20

## 2023-02-20 NOTE — PROGRESS NOTES
ESTABLISHED PATIENT    History 02/20/2023:  Nuno returns here today for follow-up evaluation of his left shoulder.  He reports having no change in his condition since his last visit.  He is still having left-sided neck pain with radiation down his arm and into his fingertips.  He occasionally has numbness and tingling but states the symptoms are not constant.  He has noticed more stiffness and reproduction of his arm pain with movement of his neck.    History 01/09/2023:   62 y.o. Male with a history of left shoulder pain who works as a  and is currently working on the [employer] project.  He is an avid golfer and states that he is having difficulty playing due to his left arm pain.  He denies having anyone specific injury but attributes his current symptoms to helping repair a friend's roof.  He began having pain the following day and his symptoms have become more constant.  He is having intermittent discomfort around his shoulder with radiation down his arm and into his hand.  He occasionally has tingling in his hand which is happening daily but is not constant.  He occasionally has some neck pain depending on the position.  He is having trouble sleeping at night due to the pain.     - mechanical symptoms, - instability    Is affecting ADLs.  Pain is 8/10 at it's worst.        PAST MEDICAL HISTORY    Past Medical History:   Diagnosis Date    Cataract     Diabetes mellitus     Pt is no longer has DM, managed with diet    Diabetes mellitus, type 2     Glaucoma     Psoriasis        PAST SURGICAL HISTORY     History reviewed. No pertinent surgical history.    FAMILY HISTORY    Family History   Problem Relation Age of Onset    Diabetes Mother     Hypertension Mother     Cancer Mother         Breast     Glaucoma Mother     Diabetes Father     Hypertension Father     Cancer Father         Prostate    Amblyopia Neg Hx     Blindness Neg Hx     Cataracts Neg Hx     Macular degeneration Neg Hx     Retinal  detachment Neg Hx     Strabismus Neg Hx        SOCIAL HISTORY    Social History     Socioeconomic History    Marital status:    Tobacco Use    Smoking status: Never    Smokeless tobacco: Never   Substance and Sexual Activity    Alcohol use: Yes     Comment: Socially    Drug use: No       MEDICATIONS      Current Outpatient Medications:     ciclopirox (LOPROX) 0.77 % Crea, Apply topically 2 (two) times daily., Disp: 90 g, Rfl: 2    clobetasoL (TEMOVATE) 0.05 % external solution, VAMSI EXT TO THE SCALP BID PRN, Disp: , Rfl:     clobetasol 0.05% (TEMOVATE) 0.05 % Oint, , Disp: , Rfl:     gabapentin (NEURONTIN) 300 MG capsule, Take 1 capsule (300 mg total) by mouth 3 (three) times daily as needed (Pain)., Disp: 30 capsule, Rfl: 0    guselkumab (TREMFYA) 100 mg/mL Syrg, Inject 100 mg into the skin every 8 weeks. , Disp: , Rfl:     latanoprost 0.005 % ophthalmic solution, Place 1 drop into both eyes once daily., Disp: 7.5 mL, Rfl: 3    celecoxib (CELEBREX) 200 MG capsule, Take 1 capsule (200 mg total) by mouth daily as needed for Pain., Disp: 30 capsule, Rfl: 0    traMADoL (ULTRAM) 50 mg tablet, Take 1 tablet (50 mg total) by mouth every 8 (eight) hours as needed for Pain., Disp: 10 tablet, Rfl: 0    ALLERGIES     Review of patient's allergies indicates:  No Known Allergies      REVIEW OF SYSTEMS   Constitution: Negative. Negative for chills, fever and night sweats.   HENT: Negative for congestion and headaches.    Eyes: Negative for blurred vision, left vision loss and right vision loss.   Cardiovascular: Negative for chest pain and syncope.   Respiratory: Negative for cough and shortness of breath.    Endocrine: Negative for polydipsia, polyphagia and polyuria.   Hematologic/Lymphatic: Negative for bleeding problem. Does not bruise/bleed easily.   Skin: Negative for dry skin, itching and rash.   Musculoskeletal: Negative for falls. Positive for left shoulder pain and tingling.  Gastrointestinal: Negative for  "abdominal pain and bowel incontinence.   Genitourinary: Negative for bladder incontinence and nocturia.   Neurological: Negative for disturbances in coordination, loss of balance and seizures.   Psychiatric/Behavioral: Negative for depression. The patient does not have insomnia.    Allergic/Immunologic: Negative for hives and persistent infections.     PHYSICAL EXAMINATION    Vitals: /82 (BP Location: Right arm, Patient Position: Sitting, BP Method: Medium (Manual))   Ht 6' 3" (1.905 m)   Wt 124.7 kg (275 lb)   BMI 34.37 kg/m²     General: The patient appears active and healthy with no apparent physical problems.  No disturbance of mood or affect is demonstrated. Alert and Oriented.    HEENT: Eyes normal, pupils equally round, nose normal.    Resp: Equal and symmetrical chest rises. No wheezing    CV: Regular rate    Neck: Supple; nonpainful range of motion.    Extremities: no cyanosis, clubbing, edema, or diffuse swelling.  Palpable pulses, good capillary refill of the digits.  No coolness, discoloration, edema or obvious varicosities.    Skin: no lesions noted.    Lymphatic: no detected adenopathy in the upper or lower extremities.    Neurologic: normal mental status, normal reflexes, normal gait and balance.  Patient is alert and oriented to person, place and time.  No flaccidity or spasticity is noted.  No motor or sensory deficits are noted.  Light touch is intact    Orthopaedic: Shoulder Musculoskeletal Exam    Inspection    Left      Left shoulder inspection is normal.      Ecchymosis: none      Peripheral edema: none      Atrophy: none      Deformity: none      Symmetry: symmetric      Masses: none      Skin tenting: none      Prior incision: none    Palpation    Left      Crepitus: no crepitus      Increased warmth: none      Tenderness: none    Range of Motion    Left      Left shoulder range of motion is normal.       Active ROM: normal and no pain.       Passive ROM: normal and no pain.       " Active forward elevation: 170.       Passive forward elevation: 170.       Shoulder active abduction: 100.       Passive abduction: 100.       Active external rotation at side: 60.       Passive external rotation at side: 60.       Active external rotation in abduction: 30.       Passive external rotation in abduction: 30.       Internal rotation: T7.     Strength    Left      Left shoulder strength is normal.       External rotation: 5/5. External rotation is not affected by pain.       Internal rotation: 5/5. Internal rotation is not affected by pain.       Abduction: 5/5. Abduction is not affected by pain.       Biceps: 5/5. Biceps are not affected by pain.       Triceps: 5/5. Triceps are not affected by pain.     Neurovascular    Left      Left shoulder nerve sensation is normal.    Scapula    Left       Left shoulder scapula is normal.    Special Tests    Left     Rotator Cuff Signs      Neer's test: negative       Mcdaniel test: negative       Supraspinatus: negative       Belly press test: negative       Painful arc test: negative     Biceps/ovidio Signs      [place]'s test: negative       Clicking/popping: negative     AC Joint Signs      Active horizontal adduction pain: negative     Instability Signs      Joint laxity: negative     Cervical Spine Musculoskeletal Exam    Inspection    Erythema: none    Swelling: none    Ecchymosis: none    Deformity: none    Palpation    Tenderness: none    Left      Masses: none      Spasms: none      Crepitus: none      Muscle tone: normal    Range of Motion      Cervical flexion: chin to chest. Cervical flexion detail: pain.     Cervical extension: reduced extension (0-25%). Cervical extension detail: pain.     Right      Lateral bending: normal. Lateral bending detail: no pain.       Lateral rotation: normal. Lateral rotation detail: no pain.     Left      Lateral bending: reduced bend (0-25%). Lateral bending detail: pain.       Lateral rotation: reduced rotation  (0-25%). Lateral rotation detail: pain.      Strength    Left      Deltoid: 5/5. Deltoid is not affected by pain.       Shoulder external rotation: 5/5. Shoulder external rotation is not affected by pain.       Biceps: 5/5. Biceps are not affected by pain.       Triceps: 5/5. Triceps are not affected by pain.       Wrist extension: 5/5. Wrist extension is not affected by pain.       Wrist flexion: 5/5. Wrist flexion is not affected by pain.       FDP: 5/5. FDP is not affected by pain.       Interossei: 5/5. Interossei are not affected by pain.     Sensory    Left        Deltoid patch: normal      Lateral arm: normal      Lateral forearm: normal      Medial arm: normal      Medial forearm: normal      Thumb/index finger: normal      Middle finger: normal      Little finger: normal      Entire hand: normal    Special Tests    Left        Spurling's: positive      Phalen's: negative      Tinel's - carpal tunnel: negative      Tinel's - cubital tunnel: negative      Carpal compression test: negative      IMAGING    X-Ray Neck Soft Tissue  Narrative: EXAMINATION:  XR NECK SOFT TISSUE    CLINICAL HISTORY:  Cervicalgia    TECHNIQUE:  AP and lateral soft tissue views the neck were performed.    COMPARISON:  None.    FINDINGS:  Airway is midline and patent.  Adenoids and tonsils do not appear enlarged.  Epiglottis is normal.    No significant prevertebral soft tissue edema.    Degenerative change C4-5 and C5-6.  Impression: Please see above.    Electronically signed by: Ladi Vanegas  Date:    02/20/2023  Time:    13:55    X-rays including two views of the cervical spine were completed.  I have personally reviewed the images and report today.  There are no acute findings.  No fracture or dislocation.  There is straightening of the normal lordotic curvature.  There are degenerative changes seen at C4-5 and C5-6 where there is diminished disc space and mild spondylitic changes of the vertebral bodies.      IMPRESSION        ICD-10-CM ICD-9-CM   1. Cervical radiculopathy  M54.12 723.4   2. Cervicalgia  M54.2 723.1   3. Spinal stenosis, cervical region  M48.02 723.0         MEDICATIONS PRESCRIBED      Celebrex 200 mg  Tramadol 50 mg    RECOMMENDATIONS     MRI cervical spine evaluating for neurocompressive disc resulting in left-sided radicular symptoms  Formal physical therapy focused on cervical spine range of motion including traction and modalities  Gabapentin 300 mg by mouth 3 times per day as needed for pain  Celebrex 200 mg by mouth daily as needed for pain  Tramadol 50 mg by mouth every 8 hours as needed for pain  Return to clinic in 1 week to discuss MRI results and further treatment options      All questions were answered, pt will contact us for questions or concerns in the interim.

## 2023-02-20 NOTE — TELEPHONE ENCOUNTER
Called and spoke with patient to let him know that I have schedule his MRI at [facility] on 03/02/23 at 7:30 am and his f/u with Ramon on 03/06/23 at 4pm.

## 2023-02-22 ENCOUNTER — CLINICAL SUPPORT (OUTPATIENT)
Dept: REHABILITATION | Facility: OTHER | Age: 63
End: 2023-02-22
Payer: COMMERCIAL

## 2023-02-22 DIAGNOSIS — M54.12 CERVICAL RADICULOPATHY: ICD-10-CM

## 2023-02-22 DIAGNOSIS — M54.2 CERVICALGIA: ICD-10-CM

## 2023-02-22 DIAGNOSIS — R68.89 DECREASED FUNCTIONAL ACTIVITY TOLERANCE: ICD-10-CM

## 2023-02-22 DIAGNOSIS — R29.898 DECREASED ROM OF NECK: ICD-10-CM

## 2023-02-22 PROCEDURE — 97162 PT EVAL MOD COMPLEX 30 MIN: CPT | Mod: PN

## 2023-02-22 PROCEDURE — 97530 THERAPEUTIC ACTIVITIES: CPT | Mod: PN

## 2023-02-22 NOTE — PLAN OF CARE
[facility]  Physical Therapy Initial Evaluation    Name: Nuno Tejeda  Clinic Number: [id number]    Therapy Diagnosis:   Encounter Diagnoses   Name Primary?    Cervical radiculopathy     Cervicalgia     Decreased ROM of neck     Decreased functional activity tolerance      Physician: Ramon Mccracken, MEL    Physician Orders: PT Eval and Treat   Medical Diagnosis from Referral: M54.12 (ICD-10-CM) - Cervical radiculopathy M54.2 (ICD-10-CM) - Cervicalgia   Evaluation Date: 2/22/2023  Authorization Period Expiration: 02/20/2024   Plan of Care Expiration: 5/19/2023  Visit # / Visits authorized: 1/ 1    Time In: 3:35 pm  Time Out: 4:15 pm  Total Billable Time: 40 minutes    Precautions: Standard    Subjective   Date of onset: about 3 months ago  History of current condition - Nuno reports: pain started a couple of months ago when he was doing work around the house. Pain started at back of L neck/shoulder and radiates down the arm. Tingling into to 2nd and 3rd fingers, pain at elbow and posterior neck/shoulder which is worse when he turns his head.     Sx have remained consistent. Denies weakness.    He had tx thus far with gabapenton and sterroids, which lessened the pain but it didn't go away.        Past Medical History:   Diagnosis Date    Cataract     Diabetes mellitus     Pt is no longer has DM, managed with diet    Diabetes mellitus, type 2     Glaucoma     Psoriasis      Nuno Tejeda  has no past surgical history on file.    Nuno has a current medication list which includes the following prescription(s): celecoxib, ciclopirox, clobetasol, clobetasol 0.05%, gabapentin, guselkumab, latanoprost, and tramadol.    Review of patient's allergies indicates:  No Known Allergies     Imaging, bone scan films: see epic; MRI is scheduled for next week    Prior Therapy: none recently  Social History: lives alone  Occupation: working full time as a , mostly walking  around  Prior Level of Function: sx free and independent, bikes for exercise, golfing  Current Level of Function: unable to ride his bike due to exercise, resting his arm becomes painful in the car and he turns his whole body when checking for traffic, able to sleep with Gabapentin     L handed individual    Pain:  Current 3/10, worst 8/10, best 2/10   Location: L neck and shoulder, tingling radiates down to fingertips  Description: Aching, Dull, and Tingling  Aggravating Factors: lifting  Easing Factors: pain medication    Pts goals: get back to bike riding and golf without sx    Objective     Posture: increased thoracic kyphosis    Cervical Range of Motion:    Degrees Pain   Flexion  15  no     Extension  35  peripheralization     Right Rotation  60  no     Left Rotation  60   no     Right Side Bending 35   no   Left Side Bending  20  no      Shoulder Range of Motion:   Shoulder Left Right   Flexion  WNL  WNL       Upper Extremity Strength  (R) UE  (L) UE    Shoulder flexion: 5/5 Shoulder flexion: 5/5   Shoulder ER 4/5 Shoulder ER 4/5   Shoulder IR 5/5 Shoulder IR 5/5    (best of 2 trials)  96#  :  110#       Special Tests:  Distraction   Positive    Spurlings   positive   Cervical Flexion Endurance  5 seconds, limited by peripheralization       Joint Mobility: peripheralization with C6 and C7 PA, otherwise no change, PA's hypomobile throughout    Palpation: denies TTP throughout neck       Flexibility:   mod UT restriction L>R  Mod lat restriction B      CMS Impairment/Limitation/Restriction for FOTO Neck Survey    Therapist reviewed FOTO scores for Nuno Tejeda on 2/22/2023.   FOTO documents entered into [email or web address] - see Media section.    Limitation Score: 49%    Goal: 33%         TREATMENT   Total Treatment time separate from Evaluation: 15 minutes    Nuno participated in dynamic functional therapeutic activities to improve functional performance for 13 minutes, including:  Pt education regarding PT  POC, therapy expectations, cervical radiculopathy tx including initially to reduce peripheral sx through manual tx and neural flossing  HEP: B shoulder ER with BTB 12x3, paintbrush flossing technique x 10 each palms up and palms down    Peripheralization with chin tuck or cervical retraction during evaluation    Suggest NV: continue with neural flossing, rows, pec stretch/mobility    POC to ultimately include return to functional lifting, golf, and bike riding      Nuno received the following manual therapy techniques: Joint mobilizations, Manual traction, and Soft tissue Mobilization were applied to the: neck for 2 minutes, including:  Suboccipital release with traction, L elbow supported with improved sx    Suggest: consider dry needling if pt is interested, consider use of traction machine      Home Exercises and Patient Education Provided    Education provided:   - see above, PTA role in PT tx    Written Home Exercises Provided: yes.  Exercises were reviewed and Nuno was able to demonstrate them prior to the end of the session.  Nuno demonstrated good  understanding of the education provided.     See EMR under Patient Instructions for exercises provided 2/22/2023.    Assessment   Nuno is a 62 y.o. male referred to outpatient Physical Therapy with a medical diagnosis of M54.12 (ICD-10-CM) - Cervical radiculopathy M54.2 (ICD-10-CM) - Cervicalgia. Pt presents with sx consistent with referring dx, with sx pattern along C8 nerve root in particularly. Impairments including postural dysfunction, reduced activity tolerance. Sx causing difficulty with ADLs, driving, and functional lifting.     Pt prognosis is Good.   Pt will benefit from skilled outpatient Physical Therapy to address the deficits stated above and in the chart below, provide pt/family education, and to maximize pt's level of independence.     Plan of care discussed with patient: Yes  Pt's spiritual, cultural and educational needs considered and  patient is agreeable to the plan of care and goals as stated below:     Anticipated Barriers for therapy: none    Medical Necessity is demonstrated by the following  History  Co-morbidities and personal factors that may impact the plan of care Co-morbidities:   psoriasis    Personal Factors:   lifestyle     moderate   Examination  Body Structures and Functions, activity limitations and participation restrictions that may impact the plan of care Body Regions:   neck  upper extremities  trunk    Body Systems:    gross symmetry  ROM  strength  motor control  motor learning    Participation Restrictions:    Lifting/carrying   turing    Activity limitations:   Learning and applying knowledge  no deficits    General Tasks and Commands  no deficits    Communication  no deficits    Mobility  lifting and carrying objects  fine hand use (grasping/picking up)  driving (bike, car, motorcycle)    Self care  washing oneself (bathing, drying, washing hands)  caring for body parts (brushing teeth, shaving, grooming)  looking after one's health    Domestic Life  shopping  cooking  doing house work (cleaning house, washing dishes, laundry)  assisting others    Interactions/Relationships  no deficits    Life Areas  employment    Community and Social Life  community life  recreation and leisure         high   Clinical Presentation evolving clinical presentation with changing clinical characteristics moderate   Decision Making/ Complexity Score: moderate     Goals:  Short Term Goals (4 Weeks):   1. Pt will report reduction in pain of the cervical spine and UE to <=2/10 for ease with driving.  2. Pt will demonstrate improved cervical spine ROM in all directions by 5 degrees for ease with driving    Long Term Goals (12 Weeks):   1. Pt will report being independent with HEP for maintenance of improvements gained during therapy sessions  2. Pt will report return to bike riding and golf with sx < 1/10 for continued exercise and healthy  lifestyle.  3. Pt will demonstrate cervical ROM all directions WNL without peripheralization of sx for ease with driving.  4. Pt FOTO limitation score will be <=33% to demonstrate functional improvements.      Plan   Plan of care Certification: 2/22/2023 to 5/19/2023.    Outpatient Physical Therapy 2 times weekly for 12 weeks to include the following interventions: Cervical/Lumbar Traction, Electrical Stimulation  , Manual Therapy, Moist Heat/ Ice, Neuromuscular Re-ed, Patient Education, Self Care, Therapeutic Activities, Therapeutic Exercise, and dry needling .     Toshia Patricia, PT, DPT

## 2023-02-23 DIAGNOSIS — M54.12 CERVICAL RADICULOPATHY: Primary | ICD-10-CM

## 2023-02-23 PROBLEM — R68.89 DECREASED FUNCTIONAL ACTIVITY TOLERANCE: Status: ACTIVE | Noted: 2023-02-23

## 2023-02-23 PROBLEM — R29.898 DECREASED ROM OF NECK: Status: ACTIVE | Noted: 2023-02-23

## 2023-03-02 ENCOUNTER — HOSPITAL ENCOUNTER (OUTPATIENT)
Dept: RADIOLOGY | Facility: OTHER | Age: 63
Discharge: HOME | End: 2023-03-02
Attending: PHYSICIAN ASSISTANT
Payer: COMMERCIAL

## 2023-03-02 DIAGNOSIS — M54.2 CERVICALGIA: ICD-10-CM

## 2023-03-02 DIAGNOSIS — M48.02 SPINAL STENOSIS, CERVICAL REGION: ICD-10-CM

## 2023-03-02 DIAGNOSIS — M54.12 CERVICAL RADICULOPATHY: ICD-10-CM

## 2023-03-03 ENCOUNTER — TELEPHONE (OUTPATIENT)
Dept: SPORTS MEDICINE | Facility: CLINIC | Age: 63
End: 2023-03-03
Payer: COMMERCIAL

## 2023-03-03 RX ORDER — DIAZEPAM 5 MG/1
5 TABLET ORAL ONCE
Qty: 1 TABLET | Refills: 0 | Status: SHIPPED | OUTPATIENT
Start: 2023-03-03 | End: 2023-05-24

## 2023-03-03 NOTE — TELEPHONE ENCOUNTER
Called and spoke with patient to reschedule his appointment with us schedule on 03/06/23 due to he has an appointment with MRI on 03/06/23, Appointment reschedule for his MRI result on 03/13/23 at 2 pm.  Patient state that the MRI department told him to have us have something call in for him due to MRI going to be little to tight and will need something to [place] down.  Message pat on to Ramon.

## 2023-03-06 ENCOUNTER — HOSPITAL ENCOUNTER (OUTPATIENT)
Dept: RADIOLOGY | Facility: HOSPITAL | Age: 63
Discharge: HOME | End: 2023-03-06
Attending: PHYSICIAN ASSISTANT
Payer: COMMERCIAL

## 2023-03-06 PROCEDURE — 72141 MRI CERVICAL SPINE WITHOUT CONTRAST: ICD-10-PCS | Mod: 26,,, | Performed by: INTERNAL MEDICINE

## 2023-03-06 PROCEDURE — 72141 MRI NECK SPINE W/O DYE: CPT | Mod: 26,,, | Performed by: INTERNAL MEDICINE

## 2023-03-06 PROCEDURE — 72141 MRI NECK SPINE W/O DYE: CPT | Mod: TC

## 2023-03-08 NOTE — PROGRESS NOTES
"[facility]   Physical Therapy Treatment Note     Name: Nuno Tejeda  Clinic Number: [id number]    Therapy Diagnosis:   Encounter Diagnoses   Name Primary?    Decreased ROM of neck Yes    Decreased functional activity tolerance      Physician: Ramon Mccracken PA-C    Visit Date: 3/9/2023    Physician Orders: PT Eval and Treat   Medical Diagnosis from Referral: M54.12 (ICD-10-CM) - Cervical radiculopathy M54.2 (ICD-10-CM) - Cervicalgia   Evaluation Date: 2/22/2023  Authorization Period Expiration: 02/20/2024   Plan of Care Expiration: 5/19/2023  Visit # / Visits authorized: 2 (1/ 12)  FOTO: 1/ 5 (2/22/2023)  PTA Visit #: 1/ 5    Precautions: Standard    Time In: 5:30 pm  Time Out: 6:15 pm  Total Billable Time: 45 minutes    SUBJECTIVE   Pt reports: Feels good, but had peripheral symptoms when he sat down on the couch in the waiting area.    He was compliant with home exercise program.  Response to previous treatment: "I felt already"  Functional change: None today    Pain: 1-2/10  Location: L neck to fingers    OBJECTIVE     2/22/2023:  Posture: increased thoracic kyphosis     Cervical Range of Motion:     Degrees Pain   Flexion  15  no      Extension  35  peripheralization      Right Rotation  60  no      Left Rotation  60   no      Right Side Bending 35   no   Left Side Bending  20  no      Shoulder Range of Motion:   Shoulder Left Right   Flexion  WNL  WNL      Upper Extremity Strength  (R) UE   (L) UE     Shoulder flexion: 5/5 Shoulder flexion: 5/5   Shoulder ER 4/5 Shoulder ER 4/5   Shoulder IR 5/5 Shoulder IR 5/5    (best of 2 trials)  96#  :  110#      Special Tests:  Distraction   Positive    Spurlings   positive   Cervical Flexion Endurance  5 seconds, limited by peripheralization         Joint Mobility: peripheralization with C6 and C7 PA, otherwise no change, PA's hypomobile throughout     Palpation: denies TTP throughout neck        Flexibility:   mod UT restriction " "L>R  Mod lat restriction B        CMS Impairment/Limitation/Restriction for FOTO Neck Survey     Therapist reviewed FOTO scores for Nuno Tejeda on 2/22/2023.   FOTO documents entered into EP[employer] - see Media section.     Limitation Score: 49%     Goal: 33%        TREATMENT     Nuno received the bolded treatments listed below:      Patient received therapeutic exercises for 37 minutes for improved strength and AROM including:  +UBE 5'  B shoulder ER with BTB 12x3   Paintbrush flossing technique x 10 each palms up and palms down  +Median nerve glide x 10  +Pec stretch 3'  +Rows on Freemotion 13#  2 x 10  +Seated scap squeezes x 20  3" hold  +Chin tucks x 10  5" hold  +Cervical retraction x 10  5" hold  +Slouch correct x 10       POC to ultimately include return to functional lifting, golf, and bike riding    Patient received manual therapeutic technique for 8 minutes for improved soft tissue and joint mobility including:  Suboccipital release with traction L elbow supported with improved sx     Suggest: consider dry needling if pt is interested, consider use of traction machine      Patient received neuromuscular reeducation for 00 minutes for improved proprioception and balance including:        Patient received therapeutic activities for 00 minutes for improved tolerance to functional activities including:        PATIENT EDUCATION AND HOME EXERCISES     Home Exercises Provided and Patient Education Provided     Education provided:   -Educated pt on importance of compliance with their HEP this visit  -Workplace ergonomics  -Postural awareness    Written Home Exercises Provided: Patient instructed to cont prior HEP. Exercises were reviewed and Nuno was able to demonstrate them prior to the end of the session.  Nuno demonstrated good  understanding of the education provided. See EMR under Patient Instructions for exercises provided during therapy sessions    ASSESSMENT   Pt tolerated treatment well with " peripheralization of symptoms with nerve glides, rows, chin tucks, and cervical retractions, but relief of symptoms when returning to neutral. Eduction on proper posture and work space ergonomics.     Nuno Is progressing well towards his goals.   Pt prognosis is Good.     Pt will continue to benefit from skilled outpatient physical therapy to address the deficits listed in the problem list box on initial evaluation, provide pt/family education and to maximize pt's level of independence in the home and community environment.     Pt's spiritual, cultural and educational needs considered and pt agreeable to plan of care and goals.     Anticipated barriers to physical therapy: None    Goals:   Short Term Goals (4 Weeks):   1. Pt will report reduction in pain of the cervical spine and UE to <=2/10 for ease with driving.  2. Pt will demonstrate improved cervical spine ROM in all directions by 5 degrees for ease with driving     Long Term Goals (12 Weeks):   1. Pt will report being independent with HEP for maintenance of improvements gained during therapy sessions  2. Pt will report return to bike riding and golf with sx < 1/10 for continued exercise and healthy lifestyle.  3. Pt will demonstrate cervical ROM all directions WNL without peripheralization of sx for ease with driving.  4. Pt FOTO limitation score will be <=33% to demonstrate functional improvements.    PLAN   Plan of care Certification: 2/22/2023 to 5/19/2023.    Improve cervical ROM     Outpatient Physical Therapy 2 times weekly for 12 weeks to include the following interventions: Cervical/Lumbar Traction, Electrical Stimulation  , Manual Therapy, Moist Heat/ Ice, Neuromuscular Re-ed, Patient Education, Self Care, Therapeutic Activities, Therapeutic Exercise, and dry needling .       Louise Patricia III, PTA

## 2023-03-09 ENCOUNTER — CLINICAL SUPPORT (OUTPATIENT)
Dept: REHABILITATION | Facility: OTHER | Age: 63
End: 2023-03-09
Payer: COMMERCIAL

## 2023-03-09 ENCOUNTER — DOCUMENTATION ONLY (OUTPATIENT)
Dept: REHABILITATION | Facility: OTHER | Age: 63
End: 2023-03-09

## 2023-03-09 DIAGNOSIS — R29.898 DECREASED ROM OF NECK: Primary | ICD-10-CM

## 2023-03-09 DIAGNOSIS — R68.89 DECREASED FUNCTIONAL ACTIVITY TOLERANCE: ICD-10-CM

## 2023-03-09 PROCEDURE — 97110 THERAPEUTIC EXERCISES: CPT | Mod: PN,CQ

## 2023-03-09 PROCEDURE — 97140 MANUAL THERAPY 1/> REGIONS: CPT | Mod: PN,CQ

## 2023-03-10 NOTE — PROGRESS NOTES
PT/PTA met face to face to discuss pt's treatment plan and progress towards established goals. Pt will be seen by a physical therapist minimally every 6th visit or every 30 days.    Louise Patricia III, PTA

## 2023-03-15 NOTE — PROGRESS NOTES
"[facility]   Physical Therapy Treatment Note     Name: Nuno Tejeda  Clinic Number: [id number]    Therapy Diagnosis:   Encounter Diagnoses   Name Primary?    Decreased ROM of neck Yes    Decreased functional activity tolerance        Physician: Ramon Mccracken PA-C    Visit Date: 3/16/2023    Physician Orders: PT Eval and Treat   Medical Diagnosis from Referral: M54.12 (ICD-10-CM) - Cervical radiculopathy M54.2 (ICD-10-CM) - Cervicalgia   Evaluation Date: 2/22/2023  Authorization Period Expiration: 02/20/2024   Plan of Care Expiration: 5/19/2023  Visit # / Visits authorized: 3 (2/ 12)  FOTO: 1/ 5 (2/22/2023)  PTA Visit #: 1/ 5    Precautions: Standard    Time In: 3:29 pm (late arrival)   Time Out: 4:05 pm   Total Billable Time: 30 minutes    SUBJECTIVE   Pt reports: he has been doing better.  Notes reduced symptoms down into the fingers.  He has been keeping up with the exercises     He was compliant with home exercise program.  Response to previous treatment: "I felt already"  Functional change: None today    Pain: 1-2/10  Location: L neck to fingers    OBJECTIVE    Objective measures updated at progress report unless otherwise noted.      Limited with seated thoracic rotation bilateral     TREATMENT     Nuno received the bolded treatments listed below:      Patient received therapeutic exercises for 30 minutes for improved strength and AROM including:  Paintbrush flossing technique x 10 each palms up and palms down  Median nerve glide x 10  Rows on Freemotion 13#  3x 10  Shoulder extension with grey theratube 3x 10  Chop with BTB x20  Open books 5 second holds x10 bilateral   Chops with BTB on foam from kneeling position x30 each   Seated extension over 1/2 foam x20  Seated cervical retraction 15x 5 second holds  Seated chin tucks 15x 5 second holds    Not performed today:  UBE 5'  B shoulder ER with BTB 12x3   Pec stretch 3'  Seated scap squeezes x 20  3" hold  Slouch correct x " 10       POC to ultimately include return to functional lifting, golf, and bike riding    Patient received manual therapeutic technique for 00 minutes for improved soft tissue and joint mobility including:  Suboccipital release with traction L elbow supported with improved sx     Suggest: consider dry needling if pt is interested, consider use of traction machine      Patient received neuromuscular reeducation for 00 minutes for improved proprioception and balance including:      Patient received therapeutic activities for 00 minutes for improved tolerance to functional activities including:        PATIENT EDUCATION AND HOME EXERCISES     Home Exercises Provided and Patient Education Provided     Education provided:   -Educated pt on importance of compliance with their HEP this visit  -Workplace ergonomics  -Postural awareness    Written Home Exercises Provided: Patient instructed to cont prior HEP. Exercises were reviewed and Nuno was able to demonstrate them prior to the end of the session.  Nuno demonstrated good  understanding of the education provided. See EMR under Patient Instructions for exercises provided during therapy sessions    ASSESSMENT   Patient tolerated treatment well today witho reported overall symptom reduction since IE.  He continues to have the symptoms down into his hand but it is not as constant as it used to be.   Initiated some rotational activities to simulate return to golf with plan for additional formal assessment at future visits as patient is noted have limited thoracic rotation.     Nuno Is progressing well towards his goals.   Pt prognosis is Good.     Pt will continue to benefit from skilled outpatient physical therapy to address the deficits listed in the problem list box on initial evaluation, provide pt/family education and to maximize pt's level of independence in the home and community environment.     Pt's spiritual, cultural and educational needs considered and pt  agreeable to plan of care and goals.     Anticipated barriers to physical therapy: None    Goals:   Short Term Goals (4 Weeks):   1. Pt will report reduction in pain of the cervical spine and UE to <=2/10 for ease with driving.  2. Pt will demonstrate improved cervical spine ROM in all directions by 5 degrees for ease with driving     Long Term Goals (12 Weeks):   1. Pt will report being independent with HEP for maintenance of improvements gained during therapy sessions  2. Pt will report return to bike riding and golf with sx < 1/10 for continued exercise and healthy lifestyle.  3. Pt will demonstrate cervical ROM all directions WNL without peripheralization of sx for ease with driving.  4. Pt FOTO limitation score will be <=33% to demonstrate functional improvements.    PLAN   Plan of care Certification: 2/22/2023 to 5/19/2023.    Improve cervical ROM     Outpatient Physical Therapy 2 times weekly for 12 weeks to include the following interventions: Cervical/Lumbar Traction, Electrical Stimulation  , Manual Therapy, Moist Heat/ Ice, Neuromuscular Re-ed, Patient Education, Self Care, Therapeutic Activities, Therapeutic Exercise, and dry needling .       Val Craig

## 2023-03-16 ENCOUNTER — CLINICAL SUPPORT (OUTPATIENT)
Dept: REHABILITATION | Facility: OTHER | Age: 63
End: 2023-03-16
Payer: COMMERCIAL

## 2023-03-16 DIAGNOSIS — R29.898 DECREASED ROM OF NECK: Primary | ICD-10-CM

## 2023-03-16 DIAGNOSIS — R68.89 DECREASED FUNCTIONAL ACTIVITY TOLERANCE: ICD-10-CM

## 2023-03-16 PROCEDURE — 97110 THERAPEUTIC EXERCISES: CPT | Mod: PN

## 2023-03-20 ENCOUNTER — DOCUMENTATION ONLY (OUTPATIENT)
Dept: REHABILITATION | Facility: OTHER | Age: 63
End: 2023-03-20
Payer: COMMERCIAL

## 2023-03-20 NOTE — PROGRESS NOTES
Patient was scheduled for a physical therapy treatment appointment at [facility] on 3/20/2023. Patient failed to appear for the appointment without prior notification today.     Louise Patricia III, PTA

## 2023-03-27 ENCOUNTER — OFFICE VISIT (OUTPATIENT)
Dept: SPORTS MEDICINE | Facility: CLINIC | Age: 63
End: 2023-03-27
Payer: COMMERCIAL

## 2023-03-27 VITALS
BODY MASS INDEX: 34.19 KG/M2 | HEIGHT: 75 IN | SYSTOLIC BLOOD PRESSURE: 142 MMHG | DIASTOLIC BLOOD PRESSURE: 90 MMHG | WEIGHT: 275 LBS

## 2023-03-27 DIAGNOSIS — M54.2 NECK PAIN: Primary | ICD-10-CM

## 2023-03-27 DIAGNOSIS — M54.12 CERVICAL RADICULOPATHY: ICD-10-CM

## 2023-03-27 DIAGNOSIS — M48.02 SPINAL STENOSIS, CERVICAL REGION: ICD-10-CM

## 2023-03-27 DIAGNOSIS — M50.30 DEGENERATIVE DISC DISEASE, CERVICAL: ICD-10-CM

## 2023-03-27 PROCEDURE — 3077F PR MOST RECENT SYSTOLIC BLOOD PRESSURE >= 140 MM HG: ICD-10-PCS | Mod: CPTII,S$GLB,, | Performed by: PHYSICIAN ASSISTANT

## 2023-03-27 PROCEDURE — 99999 PR PBB SHADOW E&M-EST. PATIENT-LVL III: ICD-10-PCS | Mod: PBBFAC,,, | Performed by: PHYSICIAN ASSISTANT

## 2023-03-27 PROCEDURE — 1159F PR MEDICATION LIST DOCUMENTED IN MEDICAL RECORD: ICD-10-PCS | Mod: CPTII,S$GLB,, | Performed by: PHYSICIAN ASSISTANT

## 2023-03-27 PROCEDURE — 99999 PR PBB SHADOW E&M-EST. PATIENT-LVL III: CPT | Mod: PBBFAC,,, | Performed by: PHYSICIAN ASSISTANT

## 2023-03-27 PROCEDURE — 99213 PR OFFICE/OUTPT VISIT, EST, LEVL III, 20-29 MIN: ICD-10-PCS | Mod: S$GLB,,, | Performed by: PHYSICIAN ASSISTANT

## 2023-03-27 PROCEDURE — 3008F BODY MASS INDEX DOCD: CPT | Mod: CPTII,S$GLB,, | Performed by: PHYSICIAN ASSISTANT

## 2023-03-27 PROCEDURE — 3080F PR MOST RECENT DIASTOLIC BLOOD PRESSURE >= 90 MM HG: ICD-10-PCS | Mod: CPTII,S$GLB,, | Performed by: PHYSICIAN ASSISTANT

## 2023-03-27 PROCEDURE — 1159F MED LIST DOCD IN RCRD: CPT | Mod: CPTII,S$GLB,, | Performed by: PHYSICIAN ASSISTANT

## 2023-03-27 PROCEDURE — 3008F PR BODY MASS INDEX (BMI) DOCUMENTED: ICD-10-PCS | Mod: CPTII,S$GLB,, | Performed by: PHYSICIAN ASSISTANT

## 2023-03-27 PROCEDURE — 3080F DIAST BP >= 90 MM HG: CPT | Mod: CPTII,S$GLB,, | Performed by: PHYSICIAN ASSISTANT

## 2023-03-27 PROCEDURE — 1160F PR REVIEW ALL MEDS BY PRESCRIBER/CLIN PHARMACIST DOCUMENTED: ICD-10-PCS | Mod: CPTII,S$GLB,, | Performed by: PHYSICIAN ASSISTANT

## 2023-03-27 PROCEDURE — 1160F RVW MEDS BY RX/DR IN RCRD: CPT | Mod: CPTII,S$GLB,, | Performed by: PHYSICIAN ASSISTANT

## 2023-03-27 PROCEDURE — 3077F SYST BP >= 140 MM HG: CPT | Mod: CPTII,S$GLB,, | Performed by: PHYSICIAN ASSISTANT

## 2023-03-27 PROCEDURE — 99213 OFFICE O/P EST LOW 20 MIN: CPT | Mod: S$GLB,,, | Performed by: PHYSICIAN ASSISTANT

## 2023-03-27 NOTE — PROGRESS NOTES
ESTABLISHED PATIENT    History 03/27/2023:  Nuno returns here today for follow-up evaluation of his cervical spine and left shoulder.  An MRI was ordered at his last visit and he is here today to discuss the results.  He states that he is responding well to physical therapy and the medications prescribed to him.  The radiating pain and tingling have resolved.  His current pain as a 2/10 and does not inhibit him in any way.    History 02/20/2023:  Nuno returns here today for follow-up evaluation of his left shoulder.  He reports having no change in his condition since his last visit.  He is still having left-sided neck pain with radiation down his arm and into his fingertips.  He occasionally has numbness and tingling but states the symptoms are not constant.  He has noticed more stiffness and reproduction of his arm pain with movement of his neck.    History 01/09/2023:   62 y.o. Male with a history of left shoulder pain who works as a  and is currently working on the [employer] project.  He is an avid golfer and states that he is having difficulty playing due to his left arm pain.  He denies having anyone specific injury but attributes his current symptoms to helping repair a friend's roof.  He began having pain the following day and his symptoms have become more constant.  He is having intermittent discomfort around his shoulder with radiation down his arm and into his hand.  He occasionally has tingling in his hand which is happening daily but is not constant.  He occasionally has some neck pain depending on the position.  He is having trouble sleeping at night due to the pain.     - mechanical symptoms, - instability    Is affecting ADLs.  Pain is 8/10 at it's worst.        PAST MEDICAL HISTORY    Past Medical History:   Diagnosis Date    Cataract     Diabetes mellitus     Pt is no longer has DM, managed with diet    Diabetes mellitus, type 2     Glaucoma     Psoriasis        PAST SURGICAL  HISTORY     History reviewed. No pertinent surgical history.    FAMILY HISTORY    Family History   Problem Relation Age of Onset    Diabetes Mother     Hypertension Mother     Cancer Mother         Breast     Glaucoma Mother     Diabetes Father     Hypertension Father     Cancer Father         Prostate    Amblyopia Neg Hx     Blindness Neg Hx     Cataracts Neg Hx     Macular degeneration Neg Hx     Retinal detachment Neg Hx     Strabismus Neg Hx        SOCIAL HISTORY    Social History     Socioeconomic History    Marital status:    Tobacco Use    Smoking status: Never    Smokeless tobacco: Never   Substance and Sexual Activity    Alcohol use: Yes     Comment: Socially    Drug use: No       MEDICATIONS      Current Outpatient Medications:     celecoxib (CELEBREX) 200 MG capsule, Take 1 capsule (200 mg total) by mouth daily as needed for Pain., Disp: 30 capsule, Rfl: 0    ciclopirox (LOPROX) 0.77 % Crea, Apply topically 2 (two) times daily., Disp: 90 g, Rfl: 2    clobetasoL (TEMOVATE) 0.05 % external solution, VAMSI EXT TO THE SCALP BID PRN, Disp: , Rfl:     clobetasol 0.05% (TEMOVATE) 0.05 % Oint, , Disp: , Rfl:     gabapentin (NEURONTIN) 300 MG capsule, Take 1 capsule (300 mg total) by mouth 3 (three) times daily as needed (Pain)., Disp: 30 capsule, Rfl: 0    guselkumab (TREMFYA) 100 mg/mL Syrg, Inject 100 mg into the skin every 8 weeks. , Disp: , Rfl:     latanoprost 0.005 % ophthalmic solution, Place 1 drop into both eyes once daily., Disp: 7.5 mL, Rfl: 3    traMADoL (ULTRAM) 50 mg tablet, Take 1 tablet (50 mg total) by mouth every 8 (eight) hours as needed for Pain., Disp: 10 tablet, Rfl: 0    diazePAM (VALIUM) 5 MG tablet, Take 1 tablet (5 mg total) by mouth once. Take 1 hour prior to your MRI. for 1 dose, Disp: 1 tablet, Rfl: 0    ALLERGIES     Review of patient's allergies indicates:  No Known Allergies      REVIEW OF SYSTEMS   Constitution: Negative. Negative for chills, fever and night sweats.   HENT:  "Negative for congestion and headaches.    Eyes: Negative for blurred vision, left vision loss and right vision loss.   Cardiovascular: Negative for chest pain and syncope.   Respiratory: Negative for cough and shortness of breath.    Endocrine: Negative for polydipsia, polyphagia and polyuria.   Hematologic/Lymphatic: Negative for bleeding problem. Does not bruise/bleed easily.   Skin: Negative for dry skin, itching and rash.   Musculoskeletal: Negative for falls. Positive for left shoulder pain and tingling.  Gastrointestinal: Negative for abdominal pain and bowel incontinence.   Genitourinary: Negative for bladder incontinence and nocturia.   Neurological: Negative for disturbances in coordination, loss of balance and seizures.   Psychiatric/Behavioral: Negative for depression. The patient does not have insomnia.    Allergic/Immunologic: Negative for hives and persistent infections.     PHYSICAL EXAMINATION    Vitals: BP (!) 142/90 (BP Location: Right arm, Patient Position: Sitting, BP Method: Large (Manual))   Ht 6' 3" (1.905 m)   Wt 124.7 kg (275 lb)   BMI 34.37 kg/m²     General: The patient appears active and healthy with no apparent physical problems.  No disturbance of mood or affect is demonstrated. Alert and Oriented.    HEENT: Eyes normal, pupils equally round, nose normal.    Resp: Equal and symmetrical chest rises. No wheezing    CV: Regular rate    Neck: Supple; nonpainful range of motion.    Extremities: no cyanosis, clubbing, edema, or diffuse swelling.  Palpable pulses, good capillary refill of the digits.  No coolness, discoloration, edema or obvious varicosities.    Skin: no lesions noted.    Lymphatic: no detected adenopathy in the upper or lower extremities.    Neurologic: normal mental status, normal reflexes, normal gait and balance.  Patient is alert and oriented to person, place and time.  No flaccidity or spasticity is noted.  No motor or sensory deficits are noted.  Light touch is " intact    Orthopaedic: Shoulder Musculoskeletal Exam    Inspection    Left      Left shoulder inspection is normal.      Ecchymosis: none      Peripheral edema: none      Atrophy: none      Deformity: none      Symmetry: symmetric      Masses: none      Skin tenting: none      Prior incision: none    Palpation    Left      Crepitus: no crepitus      Increased warmth: none      Tenderness: none    Range of Motion    Left      Left shoulder range of motion is normal.       Active ROM: normal and no pain.       Passive ROM: normal and no pain.       Active forward elevation: 170.       Passive forward elevation: 170.       Shoulder active abduction: 100.       Passive abduction: 100.       Active external rotation at side: 60.       Passive external rotation at side: 60.       Active external rotation in abduction: 30.       Passive external rotation in abduction: 30.       Internal rotation: T7.     Strength    Left      Left shoulder strength is normal.       External rotation: 5/5. External rotation is not affected by pain.       Internal rotation: 5/5. Internal rotation is not affected by pain.       Abduction: 5/5. Abduction is not affected by pain.       Biceps: 5/5. Biceps are not affected by pain.       Triceps: 5/5. Triceps are not affected by pain.     Neurovascular    Left      Left shoulder nerve sensation is normal.    Scapula    Left       Left shoulder scapula is normal.    Special Tests    Left     Rotator Cuff Signs      Neer's test: negative       Mcdaniel test: negative       Supraspinatus: negative       Belly press test: negative       Painful arc test: negative     Biceps/ovidio Signs      [place]'s test: negative       Clicking/popping: negative     AC Joint Signs      Active horizontal adduction pain: negative     Instability Signs      Joint laxity: negative     Cervical Spine Musculoskeletal Exam    Inspection    Erythema: none    Swelling: none    Ecchymosis: none    Deformity:  none    Palpation    Tenderness: none    Left      Masses: none      Spasms: none      Crepitus: none      Muscle tone: normal    Range of Motion      Cervical flexion: chin to chest. Cervical flexion detail: pain.     Cervical extension: reduced extension (0-25%). Cervical extension detail: no pain.     Right      Lateral bending: normal. Lateral bending detail: no pain.       Lateral rotation: normal. Lateral rotation detail: no pain.     Left      Lateral bending: reduced bend (0-25%). Lateral bending detail: no pain.       Lateral rotation: reduced rotation (0-25%). Lateral rotation detail: no pain.      Strength    Left      Deltoid: 5/5. Deltoid is not affected by pain.       Shoulder external rotation: 5/5. Shoulder external rotation is not affected by pain.       Biceps: 5/5. Biceps are not affected by pain.       Triceps: 5/5. Triceps are not affected by pain.       Wrist extension: 5/5. Wrist extension is not affected by pain.       Wrist flexion: 5/5. Wrist flexion is not affected by pain.       FDP: 5/5. FDP is not affected by pain.       Interossei: 5/5. Interossei are not affected by pain.     Sensory    Left        Deltoid patch: normal      Lateral arm: normal      Lateral forearm: normal      Medial arm: normal      Medial forearm: normal      Thumb/index finger: normal      Middle finger: normal      Little finger: normal      Entire hand: normal    Special Tests    Left      Spurling's: negative      Phalen's: negative      Tinel's - carpal tunnel: negative      Tinel's - cubital tunnel: negative      Carpal compression test: negative      IMAGING    MRI Cervical Spine Without Contrast  Narrative: EXAMINATION:  MRI CERVICAL SPINE WITHOUT CONTRAST    CLINICAL HISTORY:  Neck pain, chronic;Spinal stenosis, cervical;Left-sided radicular symptoms;.  Radiculopathy, cervical region    TECHNIQUE:  Multiplanar, multisequence MR images of the cervical spine were acquired without the administration of  contrast.    COMPARISON:  None.    FINDINGS:  Cervical spinal alignment and vertebral body heights are maintained.  No acute fracture.  T1 and T2 vertebral body hemangiomas.  Diffuse hypointense bone marrow signal likely reflecting red marrow reconversion.    Partially visualized intracranial contents are unremarkable.  Craniocervical junction appears within normal limits.  Cervical cord is normal caliber and signal characteristics.    Cervical soft tissues are within normal limits.  No lymphadenopathy.  Subcutaneous soft tissues are unremarkable.  Vertebral artery flow voids are preserved.    C2-C3: Posterior disc osteophyte complex and uncovertebral spurring contributing to mild bilateral foraminal narrowing, worse on the right. No spinal canal stenosis.    C3-C4: Posterior disc osteophyte complex and uncovertebral spurring which partially effaces the ventral CSF sleeve.  Findings contribute to mild bilateral foraminal narrowing, and mild spinal canal stenosis.    C4-C5: Posterior disc osteophyte complex and uncovertebral spurring which partially effaces the ventral CSF sleeve.  Findings contribute to mild right, moderate left foraminal narrowing, and mild spinal canal stenosis.    C5-C6: Posterior disc osteophyte complex and uncovertebral spurring which partially effaces the ventral CSF sleeve.  Findings contribute to moderate bilateral foraminal narrowing, worse on the right, and mild spinal canal stenosis with effacement of the right lateral recess.    C6-C7: Posterior disc osteophyte complex and uncovertebral spurring which partially effaces the ventral CSF sleeve.  Findings contribute to mild right, moderate left foraminal narrowing, and mild spinal canal stenosis.    C7-T1: Posterior disc osteophyte complex and uncovertebral spurring which partially effaces the ventral CSF sleeve.  Findings contribute to mild left foraminal narrowing.  No spinal canal stenosis.  Impression: Degenerative changes resulting in  mild canal stenosis and mild-moderate foraminal stenosis at multiple levels as detailed above.    Other findings as described.    Electronically signed by resident: Genaro Desouza MD  Date:    03/06/2023  Time:    10:18    Electronically signed by: Drew Gonzalez  Date:    03/06/2023  Time:    15:58    X-rays including two views of the cervical spine were completed.  I have personally reviewed the images and report today.  There are no acute findings.  No fracture or dislocation.  There is straightening of the normal lordotic curvature.  There are degenerative changes seen at C4-5 and C5-6 where there is diminished disc space and mild spondylitic changes of the vertebral bodies.      IMPRESSION       ICD-10-CM ICD-9-CM   1. Neck pain  M54.2 723.1   2. Cervical radiculopathy  M54.12 723.4   3. Spinal stenosis, cervical region  M48.02 723.0   4. Degenerative disc disease, cervical  M50.30 722.4           MEDICATIONS PRESCRIBED      Celebrex 200 mg  Tramadol 50 mg    RECOMMENDATIONS     Continue physical therapy focused on cervical spine range of motion including traction and modalities  Gabapentin 300 mg by mouth 3 times per day as needed for numbness, tingling, and pain  Celebrex 200 mg by mouth daily as needed for inflammation and pain  Tramadol 50 mg by mouth every 8 hours as needed for pain  Return to clinic as needed      All questions were answered, pt will contact us for questions or concerns in the interim.

## 2023-04-10 ENCOUNTER — PATIENT MESSAGE (OUTPATIENT)
Dept: REHABILITATION | Facility: OTHER | Age: 63
End: 2023-04-10

## 2023-04-25 ENCOUNTER — PATIENT MESSAGE (OUTPATIENT)
Dept: RESEARCH | Facility: HOSPITAL | Age: 63
End: 2023-04-25
Payer: COMMERCIAL

## 2023-05-02 ENCOUNTER — PATIENT MESSAGE (OUTPATIENT)
Dept: RESEARCH | Facility: HOSPITAL | Age: 63
End: 2023-05-02
Payer: COMMERCIAL

## 2023-05-24 ENCOUNTER — OFFICE VISIT (OUTPATIENT)
Dept: SPORTS MEDICINE | Facility: CLINIC | Age: 63
End: 2023-05-24
Payer: COMMERCIAL

## 2023-05-24 ENCOUNTER — TELEPHONE (OUTPATIENT)
Dept: NEUROLOGY | Facility: CLINIC | Age: 63
End: 2023-05-24
Payer: COMMERCIAL

## 2023-05-24 VITALS
DIASTOLIC BLOOD PRESSURE: 93 MMHG | BODY MASS INDEX: 32.95 KG/M2 | HEART RATE: 81 BPM | HEIGHT: 75 IN | WEIGHT: 265 LBS | SYSTOLIC BLOOD PRESSURE: 179 MMHG

## 2023-05-24 DIAGNOSIS — M54.2 CERVICALGIA: ICD-10-CM

## 2023-05-24 DIAGNOSIS — M75.22 BICEPS TENDINITIS OF LEFT SHOULDER: ICD-10-CM

## 2023-05-24 DIAGNOSIS — M54.12 CERVICAL RADICULOPATHY: Primary | ICD-10-CM

## 2023-05-24 PROCEDURE — 3080F DIAST BP >= 90 MM HG: CPT | Mod: CPTII,S$GLB,, | Performed by: PHYSICIAN ASSISTANT

## 2023-05-24 PROCEDURE — 1159F MED LIST DOCD IN RCRD: CPT | Mod: CPTII,S$GLB,, | Performed by: PHYSICIAN ASSISTANT

## 2023-05-24 PROCEDURE — 3077F SYST BP >= 140 MM HG: CPT | Mod: CPTII,S$GLB,, | Performed by: PHYSICIAN ASSISTANT

## 2023-05-24 PROCEDURE — 1160F PR REVIEW ALL MEDS BY PRESCRIBER/CLIN PHARMACIST DOCUMENTED: ICD-10-PCS | Mod: CPTII,S$GLB,, | Performed by: PHYSICIAN ASSISTANT

## 2023-05-24 PROCEDURE — 99213 PR OFFICE/OUTPT VISIT, EST, LEVL III, 20-29 MIN: ICD-10-PCS | Mod: S$GLB,,, | Performed by: PHYSICIAN ASSISTANT

## 2023-05-24 PROCEDURE — 99999 PR PBB SHADOW E&M-EST. PATIENT-LVL III: ICD-10-PCS | Mod: PBBFAC,,, | Performed by: PHYSICIAN ASSISTANT

## 2023-05-24 PROCEDURE — 99999 PR PBB SHADOW E&M-EST. PATIENT-LVL III: CPT | Mod: PBBFAC,,, | Performed by: PHYSICIAN ASSISTANT

## 2023-05-24 PROCEDURE — 3008F PR BODY MASS INDEX (BMI) DOCUMENTED: ICD-10-PCS | Mod: CPTII,S$GLB,, | Performed by: PHYSICIAN ASSISTANT

## 2023-05-24 PROCEDURE — 1159F PR MEDICATION LIST DOCUMENTED IN MEDICAL RECORD: ICD-10-PCS | Mod: CPTII,S$GLB,, | Performed by: PHYSICIAN ASSISTANT

## 2023-05-24 PROCEDURE — 3080F PR MOST RECENT DIASTOLIC BLOOD PRESSURE >= 90 MM HG: ICD-10-PCS | Mod: CPTII,S$GLB,, | Performed by: PHYSICIAN ASSISTANT

## 2023-05-24 PROCEDURE — 3008F BODY MASS INDEX DOCD: CPT | Mod: CPTII,S$GLB,, | Performed by: PHYSICIAN ASSISTANT

## 2023-05-24 PROCEDURE — 1160F RVW MEDS BY RX/DR IN RCRD: CPT | Mod: CPTII,S$GLB,, | Performed by: PHYSICIAN ASSISTANT

## 2023-05-24 PROCEDURE — 99213 OFFICE O/P EST LOW 20 MIN: CPT | Mod: S$GLB,,, | Performed by: PHYSICIAN ASSISTANT

## 2023-05-24 PROCEDURE — 3077F PR MOST RECENT SYSTOLIC BLOOD PRESSURE >= 140 MM HG: ICD-10-PCS | Mod: CPTII,S$GLB,, | Performed by: PHYSICIAN ASSISTANT

## 2023-05-24 RX ORDER — CELECOXIB 200 MG/1
200 CAPSULE ORAL DAILY PRN
Qty: 30 CAPSULE | Refills: 1 | Status: SHIPPED | OUTPATIENT
Start: 2023-05-24

## 2023-05-24 RX ORDER — GABAPENTIN 300 MG/1
300 CAPSULE ORAL 3 TIMES DAILY PRN
Qty: 90 CAPSULE | Refills: 0 | Status: SHIPPED | OUTPATIENT
Start: 2023-05-24

## 2023-05-24 NOTE — PROGRESS NOTES
ESTABLISHED PATIENT    History 5/24/2023;  Claudia returns here today for evaluation of his left arm.  He complains today of having recurrent shoulder and elbow pain.  He also occasionally has some tingling throughout his arm.  He completed physical therapy and was doing very well for several weeks.  He also reports having some weakness in his hand.    History 03/27/2023:  Nuno returns here today for follow-up evaluation of his cervical spine and left shoulder.  An MRI was ordered at his last visit and he is here today to discuss the results.  He states that he is responding well to physical therapy and the medications prescribed to him.  The radiating pain and tingling have resolved.  His current pain as a 2/10 and does not inhibit him in any way.    History 02/20/2023:  Nuno returns here today for follow-up evaluation of his left shoulder.  He reports having no change in his condition since his last visit.  He is still having left-sided neck pain with radiation down his arm and into his fingertips.  He occasionally has numbness and tingling but states the symptoms are not constant.  He has noticed more stiffness and reproduction of his arm pain with movement of his neck.    History 01/09/2023:   62 y.o. Male with a history of left shoulder pain who works as a  and is currently working on the [employer] project.  He is an avid golfer and states that he is having difficulty playing due to his left arm pain.  He denies having anyone specific injury but attributes his current symptoms to helping repair a friend's roof.  He began having pain the following day and his symptoms have become more constant.  He is having intermittent discomfort around his shoulder with radiation down his arm and into his hand.  He occasionally has tingling in his hand which is happening daily but is not constant.  He occasionally has some neck pain depending on the position.  He is having trouble sleeping at night due to  the pain.     - mechanical symptoms, - instability    Is affecting ADLs.  Pain is 8/10 at it's worst.        PAST MEDICAL HISTORY    Past Medical History:   Diagnosis Date    Cataract     Diabetes mellitus     Pt is no longer has DM, managed with diet    Diabetes mellitus, type 2     Glaucoma     Psoriasis        PAST SURGICAL HISTORY     History reviewed. No pertinent surgical history.    FAMILY HISTORY    Family History   Problem Relation Age of Onset    Diabetes Mother     Hypertension Mother     Cancer Mother         Breast     Glaucoma Mother     Diabetes Father     Hypertension Father     Cancer Father         Prostate    Amblyopia Neg Hx     Blindness Neg Hx     Cataracts Neg Hx     Macular degeneration Neg Hx     Retinal detachment Neg Hx     Strabismus Neg Hx        SOCIAL HISTORY    Social History     Socioeconomic History    Marital status:    Tobacco Use    Smoking status: Never    Smokeless tobacco: Never   Substance and Sexual Activity    Alcohol use: Yes     Comment: Socially    Drug use: No       MEDICATIONS      Current Outpatient Medications:     ciclopirox (LOPROX) 0.77 % Crea, Apply topically 2 (two) times daily., Disp: 90 g, Rfl: 2    clobetasoL (TEMOVATE) 0.05 % external solution, VAMSI EXT TO THE SCALP BID PRN, Disp: , Rfl:     clobetasol 0.05% (TEMOVATE) 0.05 % Oint, , Disp: , Rfl:     guselkumab (TREMFYA) 100 mg/mL Syrg, Inject 100 mg into the skin every 8 weeks. , Disp: , Rfl:     latanoprost 0.005 % ophthalmic solution, Place 1 drop into both eyes once daily., Disp: 7.5 mL, Rfl: 3    celecoxib (CELEBREX) 200 MG capsule, Take 1 capsule (200 mg total) by mouth daily as needed for Pain., Disp: 30 capsule, Rfl: 1    gabapentin (NEURONTIN) 300 MG capsule, Take 1 capsule (300 mg total) by mouth 3 (three) times daily as needed (Pain)., Disp: 90 capsule, Rfl: 0    traMADoL (ULTRAM) 50 mg tablet, Take 1 tablet (50 mg total) by mouth every 8 (eight) hours as needed for Pain. (Patient not  "taking: Reported on 5/24/2023), Disp: 10 tablet, Rfl: 0    ALLERGIES     Review of patient's allergies indicates:  No Known Allergies      REVIEW OF SYSTEMS   Constitution: Negative. Negative for chills, fever and night sweats.   HENT: Negative for congestion and headaches.    Eyes: Negative for blurred vision, left vision loss and right vision loss.   Cardiovascular: Negative for chest pain and syncope.   Respiratory: Negative for cough and shortness of breath.    Endocrine: Negative for polydipsia, polyphagia and polyuria.   Hematologic/Lymphatic: Negative for bleeding problem. Does not bruise/bleed easily.   Skin: Negative for dry skin, itching and rash.   Musculoskeletal: Negative for falls. Positive for left shoulder pain and tingling.  Gastrointestinal: Negative for abdominal pain and bowel incontinence.   Genitourinary: Negative for bladder incontinence and nocturia.   Neurological: Negative for disturbances in coordination, loss of balance and seizures.   Psychiatric/Behavioral: Negative for depression. The patient does not have insomnia.    Allergic/Immunologic: Negative for hives and persistent infections.     PHYSICAL EXAMINATION    Vitals: BP (!) 179/93   Pulse 81   Ht 6' 3" (1.905 m)   Wt 120.2 kg (265 lb)   BMI 33.12 kg/m²     General: The patient appears active and healthy with no apparent physical problems.  No disturbance of mood or affect is demonstrated. Alert and Oriented.    HEENT: Eyes normal, pupils equally round, nose normal.    Resp: Equal and symmetrical chest rises. No wheezing    CV: Regular rate    Neck: Supple; nonpainful range of motion.    Extremities: no cyanosis, clubbing, edema, or diffuse swelling.  Palpable pulses, good capillary refill of the digits.  No coolness, discoloration, edema or obvious varicosities.    Skin: no lesions noted.    Lymphatic: no detected adenopathy in the upper or lower extremities.    Neurologic: normal mental status, normal reflexes, normal gait and " balance.  Patient is alert and oriented to person, place and time.  No flaccidity or spasticity is noted.  No motor or sensory deficits are noted.  Light touch is intact    Orthopaedic: Shoulder Musculoskeletal Exam    Inspection    Left      Left shoulder inspection is normal.      Ecchymosis: none      Peripheral edema: none      Atrophy: none      Deformity: none      Symmetry: symmetric      Masses: none      Skin tenting: none      Prior incision: none    Palpation    Left      Crepitus: no crepitus      Increased warmth: none      Tenderness: none    Palpation additional comments: +TTP of the distal biceps tendon on the left; - hook test    Range of Motion    Left      Left shoulder range of motion is normal.       Active ROM: normal and no pain.       Passive ROM: normal and no pain.       Active forward elevation: 170.       Passive forward elevation: 170.       Shoulder active abduction: 100.       Passive abduction: 100.       Active external rotation at side: 60.       Passive external rotation at side: 60.       Active external rotation in abduction: 30.       Passive external rotation in abduction: 30.       Internal rotation: T7.     Strength    Left      Left shoulder strength is normal.       External rotation: 5/5. External rotation is not affected by pain.       Internal rotation: 5/5. Internal rotation is not affected by pain.       Abduction: 5/5. Abduction is not affected by pain.       Biceps: 5/5. Biceps are not affected by pain.       Triceps: 5/5. Triceps are not affected by pain.     Neurovascular    Left      Left shoulder nerve sensation is normal.    Scapula    Left       Left shoulder scapula is normal.    Special Tests    Left     Rotator Cuff Signs      Neer's test: negative       Mcdaniel test: positive (mild)       Supraspinatus: negative       Belly press test: negative       Painful arc test: negative     Biceps/ovidio Signs      Brantley's test: negative       Clicking/popping:  negative     AC Joint Signs      Active horizontal adduction pain: negative     Instability Signs      Joint laxity: negative     Cervical Spine Musculoskeletal Exam    Inspection    Erythema: none    Swelling: none    Ecchymosis: none    Deformity: none    Palpation    Tenderness: none    Left      Masses: none      Spasms: none      Crepitus: none      Muscle tone: normal    Range of Motion      Cervical flexion: chin to chest. Cervical flexion detail: pain.     Cervical extension: reduced extension (0-25%). Cervical extension detail: no pain.     Right      Lateral bending: normal. Lateral bending detail: no pain.       Lateral rotation: normal. Lateral rotation detail: no pain.     Left      Lateral bending: reduced bend (0-25%). Lateral bending detail: no pain.       Lateral rotation: reduced rotation (0-25%). Lateral rotation detail: no pain.      Strength    Left      Deltoid: 5/5. Deltoid is not affected by pain.       Shoulder external rotation: 5/5. Shoulder external rotation is not affected by pain.       Biceps: 5/5. Biceps are not affected by pain.       Triceps: 5/5. Triceps are not affected by pain.       Wrist extension: 5/5. Wrist extension is not affected by pain.       Wrist flexion: 5/5. Wrist flexion is not affected by pain.       FDP: 5/5. FDP is not affected by pain.       Interossei: 5/5. Interossei are not affected by pain.     Sensory    Left        Deltoid patch: normal      Lateral arm: normal      Lateral forearm: normal      Medial arm: normal      Medial forearm: normal      Thumb/index finger: normal      Middle finger: normal      Little finger: normal      Entire hand: normal    Special Tests    Left      Spurling's: negative      Phalen's: negative      Tinel's - carpal tunnel: negative      Tinel's - cubital tunnel: negative      Carpal compression test: negative      IMAGING    MRI Cervical Spine Without Contrast  Narrative: EXAMINATION:  MRI CERVICAL SPINE WITHOUT  CONTRAST    CLINICAL HISTORY:  Neck pain, chronic;Spinal stenosis, cervical;Left-sided radicular symptoms;.  Radiculopathy, cervical region    TECHNIQUE:  Multiplanar, multisequence MR images of the cervical spine were acquired without the administration of contrast.    COMPARISON:  None.    FINDINGS:  Cervical spinal alignment and vertebral body heights are maintained.  No acute fracture.  T1 and T2 vertebral body hemangiomas.  Diffuse hypointense bone marrow signal likely reflecting red marrow reconversion.    Partially visualized intracranial contents are unremarkable.  Craniocervical junction appears within normal limits.  Cervical cord is normal caliber and signal characteristics.    Cervical soft tissues are within normal limits.  No lymphadenopathy.  Subcutaneous soft tissues are unremarkable.  Vertebral artery flow voids are preserved.    C2-C3: Posterior disc osteophyte complex and uncovertebral spurring contributing to mild bilateral foraminal narrowing, worse on the right. No spinal canal stenosis.    C3-C4: Posterior disc osteophyte complex and uncovertebral spurring which partially effaces the ventral CSF sleeve.  Findings contribute to mild bilateral foraminal narrowing, and mild spinal canal stenosis.    C4-C5: Posterior disc osteophyte complex and uncovertebral spurring which partially effaces the ventral CSF sleeve.  Findings contribute to mild right, moderate left foraminal narrowing, and mild spinal canal stenosis.    C5-C6: Posterior disc osteophyte complex and uncovertebral spurring which partially effaces the ventral CSF sleeve.  Findings contribute to moderate bilateral foraminal narrowing, worse on the right, and mild spinal canal stenosis with effacement of the right lateral recess.    C6-C7: Posterior disc osteophyte complex and uncovertebral spurring which partially effaces the ventral CSF sleeve.  Findings contribute to mild right, moderate left foraminal narrowing, and mild spinal canal  stenosis.    C7-T1: Posterior disc osteophyte complex and uncovertebral spurring which partially effaces the ventral CSF sleeve.  Findings contribute to mild left foraminal narrowing.  No spinal canal stenosis.  Impression: Degenerative changes resulting in mild canal stenosis and mild-moderate foraminal stenosis at multiple levels as detailed above.    Other findings as described.    Electronically signed by resident: Genaro Desouza MD  Date:    03/06/2023  Time:    10:18    Electronically signed by: Drew Gonzalez  Date:    03/06/2023  Time:    15:58    X-rays including two views of the cervical spine were completed.  I have personally reviewed the images and report today.  There are no acute findings.  No fracture or dislocation.  There is straightening of the normal lordotic curvature.  There are degenerative changes seen at C4-5 and C5-6 where there is diminished disc space and mild spondylitic changes of the vertebral bodies.      IMPRESSION       ICD-10-CM ICD-9-CM   1. Cervical radiculopathy  M54.12 723.4   2. Cervicalgia  M54.2 723.1   3. Biceps tendinitis of left shoulder  M75.22 726.12         MEDICATIONS PRESCRIBED      Celebrex 200 mg  Gabapentin 300 mg    RECOMMENDATIONS     EMG/NCS for evaluation of cervical radiculopathy  Continue HEP  Gabapentin 300 mg by mouth 3 times per day as needed for numbness, tingling, and pain  Celebrex 200 mg by mouth daily as needed for inflammation and pain  Return to clinic for NCS results; will consider STEPHANIE if symptoms persist      All questions were answered, pt will contact us for questions or concerns in the interim.

## 2023-05-24 NOTE — TELEPHONE ENCOUNTER
Staff scheduled patient for EMG for July 3rd at 8am    ----- Message from Orlando Perez MA sent at 5/24/2023  2:43 PM CDT -----  Regarding: FW: Appointment    ----- Message -----  From: Mariela Jones MA  Sent: 5/24/2023   2:34 PM CDT  To: [facility] Staff  Subject: Appointment                                      Afternoon,  Patient Nuno need to schedule for an EMG, please reach out to patient.    Yumiko

## 2023-07-03 ENCOUNTER — PROCEDURE VISIT (OUTPATIENT)
Dept: NEUROLOGY | Facility: CLINIC | Age: 63
End: 2023-07-03
Payer: COMMERCIAL

## 2023-07-03 DIAGNOSIS — M54.12 CERVICAL RADICULOPATHY: ICD-10-CM

## 2023-07-03 PROCEDURE — 95911 PR NERVE CONDUCTION STUDY; 9-10 STUDIES: ICD-10-PCS | Mod: S$GLB,,, | Performed by: PHYSICAL MEDICINE & REHABILITATION

## 2023-07-03 PROCEDURE — 95911 NRV CNDJ TEST 9-10 STUDIES: CPT | Mod: S$GLB,,, | Performed by: PHYSICAL MEDICINE & REHABILITATION

## 2023-07-03 PROCEDURE — 95886 MUSC TEST DONE W/N TEST COMP: CPT | Mod: S$GLB,,, | Performed by: PHYSICAL MEDICINE & REHABILITATION

## 2023-07-03 PROCEDURE — 95886 PR EMG COMPLETE, W/ NERVE CONDUCTION STUDIES, 5+ MUSCLES: ICD-10-PCS | Mod: S$GLB,,, | Performed by: PHYSICAL MEDICINE & REHABILITATION

## 2023-07-03 NOTE — PROCEDURES
Test Date:  7/3/2023    Patient: nuno tejeda : 1960 Physician: Genaro Gonsalez D.O.   ID#: [id number] SEX: Male Ref. Phys: Ramon Mccracken PA-C     HPI: Nuno Tejeda is a 62 y.o.male who presents for NCS/EMG to evaluate for cervical radiculopathy bilaterally.      NCV & EMG Findings:  All nerve conduction studies (as indicated in the following tables) were within normal limits.  Needle evaluation of the left Pronator Teres muscle showed increased motor unit amplitude, slightly increased polyphasic potentials, and diminished recruitment.  All remaining muscles (as indicated in the following table) showed no evidence of electrical instability.    Impression:  The left pronator teres muscle showed chronic neuropathic changes.  In isolation to one muscle, this is of limited diagnostic significance.  There is no definitive evidence of a cervical radiculopathy on today's electrophysiologic study.  Please note that there are a few caveats to consider with radiculopathy as it relates to NCS/EMG testing.  For cervical radiculopathy, EMG does not evaluate radiculopathy cephalad to C5 well.  Also, NCS is often normal in radiculopathy, so we rely on the needle EMG for diagnosis.  The needle EMG will also be normal in purely demyelinating radiculopathy lesions, in predominant sensory nerve root/dorsal root lesions, and in some cases hyperacute lesions.  In these cases, the EMG/NCS will be normal, and can miss radiculopathy.  Sensitivity for needle EMG is estimated to be between 50%-71% for cervical radiculopathy, so ruling out radiculopathy solely with NCS/EMG shouldn't be considered if there is a reasonable clinical suspicion.             ___________________________  Genaro Gonsalez D.O.        NCS+  Motor Nerve Results      Latency Amplitude F-Lat Segment Distance CV Comment   Site (ms) Norm (mV) Norm (ms)  (cm) (m/s) Norm    Left Median (APB)   Wrist 4.2  < 4.7 7.1  > 3.8  Wrist-Palm - - -    Elbow 9.4  - 6.3 -  Elbow-Wrist 27.5 53  > 47    Right Median (APB)   Wrist 4.0  < 4.7 6.2  > 3.8  Wrist-Palm - - -    Elbow 9.2 - 6.2 -  Elbow-Wrist 26 50  > 47    Left Ulnar (ADM)   Wrist 3.5  < 3.7 7.4  > 3.0         Bel Elbow 9.0 - 7.0 -  Bel Elbow-Wrist 30 55  > 52    Abv Elbow 11.2 - 5.6 -  Abv Elbow-Bel Elbow 11 50  > 43    Right Ulnar (ADM)   Wrist 2.9  < 3.7 5.3  > 3.0         Bel Elbow 8.0 - 4.4 -  Bel Elbow-Wrist 30 59  > 52    Abv Elbow 10.0 - 4.2 -  Abv Elbow-Bel Elbow 10 50  > 43      Sensory Nerve Results      Latency (Peak) Amplitude (P-P) Segment Distance CV Comment   Site (ms) Norm (µV) Norm  (cm) (m/s) Norm    Left Median   Wrist-Dig II 3.6  < 4.0 20  > 8 Wrist-Dig II 16 44  > 39    Right Median   Wrist-Dig II 3.7  < 4.0 24  > 8 Wrist-Dig II 17 46  > 39    Left Ulnar   Wrist-Dig V 3.6  < 4.0 13  > 4 Wrist-Dig V 16 44  > 38    Right Ulnar   Wrist-Dig V 3.6  < 4.0 8  > 4 Wrist-Dig V 16 44  > 38    Left Radial   Forearm-Wrist 1.83  < 2.8 20  > 11 Forearm-Wrist 10 55 -    Right Radial   Forearm-Wrist 1.65  < 2.8 23  > 11 Forearm-Wrist 10 61 -      EMG+     Side Muscle Nerve Root Ins Act Fibs Psw Amp Dur Poly Recrt Int Pat Comment   Left Biceps Musculocut C5-C6 Nml Nml Nml Nml Nml 0 Nml Nml    Left Deltoid Axillary C5-C6 Nml Nml Nml Nml Nml 0 Nml Nml    Left Brachiorad Radial C5-C6 Nml Nml Nml Nml Nml 0 Nml Nml    Left Pronator Teres Median C6-C7 Nml Nml Nml *Incr Nml *1+ *Reduced Nml 5 mv MUAP   Left Triceps Radial C6-C8 Nml Nml Nml Nml Nml 0 Nml Nml    Left Cervical Parasp (Lower) Rami C7-C8 Nml Nml Nml         Left EIP Post Interosseous,  R... C7-C8 Nml Nml Nml Nml Nml 0 Nml Nml    Left FDI Ulnar C8-T1 Nml Nml Nml Nml Nml 0 Nml Nml    Right Biceps Musculocut C5-C6 Nml Nml Nml Nml Nml 0 Nml Nml    Right Deltoid Axillary C5-C6 Nml Nml Nml Nml Nml 0 Nml Nml    Right Pronator Teres Median C6-C7 Nml Nml Nml Nml Nml 0 Nml Nml    Right Triceps Radial C6-C8 Nml Nml Nml Nml Nml 0 Nml Nml    Right FDI Ulnar C8-T1 Nml  Nml Nml Nml Nml 0 Nml Nml            Waveforms:    Motor              Sensory

## 2023-09-14 ENCOUNTER — OFFICE VISIT (OUTPATIENT)
Dept: PODIATRY | Facility: CLINIC | Age: 63
End: 2023-09-14
Payer: COMMERCIAL

## 2023-09-14 DIAGNOSIS — M79.672 PAIN IN BOTH FEET: Primary | ICD-10-CM

## 2023-09-14 DIAGNOSIS — M79.671 PAIN IN BOTH FEET: Primary | ICD-10-CM

## 2023-09-14 DIAGNOSIS — M20.40 HAMMER TOE, UNSPECIFIED LATERALITY: ICD-10-CM

## 2023-09-14 DIAGNOSIS — L84 CORN OR CALLUS: ICD-10-CM

## 2023-09-14 PROCEDURE — 99999 PR PBB SHADOW E&M-EST. PATIENT-LVL II: ICD-10-PCS | Mod: PBBFAC,,, | Performed by: PODIATRIST

## 2023-09-14 PROCEDURE — 99214 PR OFFICE/OUTPT VISIT, EST, LEVL IV, 30-39 MIN: ICD-10-PCS | Mod: S$GLB,,, | Performed by: PODIATRIST

## 2023-09-14 PROCEDURE — 99999 PR PBB SHADOW E&M-EST. PATIENT-LVL II: CPT | Mod: PBBFAC,,, | Performed by: PODIATRIST

## 2023-09-14 PROCEDURE — 99214 OFFICE O/P EST MOD 30 MIN: CPT | Mod: S$GLB,,, | Performed by: PODIATRIST

## 2023-09-14 NOTE — PROGRESS NOTES
Subjective:     Patient ID: Nuno Tejeda is a 62 y.o. male.    Chief Complaint: Diabetes Mellitus (5/24/23 SEAMUS Mccracken) and Edward Reina is a 62 y.o. male who presents to the podiatry clinic  with complaint of  bilateral foot pain. Onset of the symptoms was several weeks ago. Precipitating event: none known. Current symptoms include: ability to bear weight, but with some pain. Aggravating factors: any weight bearing. Symptoms have progressed to a point and plateaued. Patient has had no prior foot problems. Evaluation to date: none. Treatment to date: none. Patients rates pain 4/10 on pain scale.    Review of Systems   Constitutional: Negative for chills.   Cardiovascular:  Negative for chest pain and claudication.   Respiratory:  Negative for cough.    Skin:  Positive for color change, dry skin and nail changes.   Musculoskeletal:  Positive for joint pain.   Gastrointestinal:  Negative for nausea.   Neurological:  Positive for paresthesias. Negative for numbness.   Psychiatric/Behavioral:  The patient is not nervous/anxious.         Objective:     Physical Exam  Constitutional:       Appearance: He is well-developed.      Comments: Oriented to time, place, and person.   Cardiovascular:      Comments: DP and PT pulses are palpable bilaterally. 3 sec capillary refill time and toes and feet are warm to touch proximally .  There is  hair growth on the feet and toes b/l. There is no edema b/l. No spider veins or varicosities present b/l.     Musculoskeletal:      Comments: Equinus noted b/l ankles with < 10 deg DF noted. MMT 5/5 in DF/PF/Inv/Ev resistance with no reproduction of pain in any direction. Passive range of motion of ankle and pedal joints is painless b/l.     Feet:      Right foot:      Skin integrity: No callus or dry skin.      Left foot:      Skin integrity: No callus or dry skin.   Lymphadenopathy:      Comments: Negative lymphadenopathy bilateral popliteal fossa and tarsal tunnel.   Skin:      Comments: No open lesions, lacerations or wounds noted.Interdigital spaces clean, dry and intact b/l. No erythema noted to b/l foot.  Nails normal color and trophic qualities.    Focal hyperkeratotic lesion consisting entirely of hyperkeratotic tissue without open skin, drainage, pus, fluctuance, malodor, or signs of infection: right plantar foot      Neurological:      Mental Status: He is alert.      Comments: Light touch, proprioception, and sharp/dull sensation are all intact bilaterally. Protective threshold with the [place]ien[place] monofilament is intact bilaterally.    Psychiatric:         Behavior: Behavior is cooperative.           Assessment:      Encounter Diagnoses   Name Primary?    Pain in both feet Yes    Hammer toe, unspecified laterality     Corn or callus      Plan:     Nuno was seen today for diabetes mellitus and callouses.    Diagnoses and all orders for this visit:    Pain in both feet  -     Ankle brachial index  -     Ankle Brachial Indices (SHWETA); Future    Hammer toe, unspecified laterality    Corn or callus      I counseled the patient on his conditions, their implications and medical management.        SHWETA ordered.     Discussed conservative treatment with shoes of adequate dimensions, material, and style to alleviate symptoms and delay or prevent surgical intervention.    - Shoe inspection. Diabetic Foot Education. Patient reminded of the importance of good nutrition and blood sugar control to help prevent podiatric complications of diabetes. Patient instructed on proper foot hygeine. We discussed wearing proper shoe gear, daily foot inspections, never walking without protective shoe gear, caution putting sharp instruments to feet     - Discussed DM foot care:  Wear comfortable, proper fitting shoes. Wash feet daily. Dry well. After drying, apply moisturizer to feet (no lotion to webspaces). Inspect feet daily for skin breaks, blisters, swelling, or redness. Wear cotton socks  (preferably white)  Change socks every day. Do NOT walk barefoot. Do NOT use heating pads or warm/hot water soaks     F/u one year DM foot exam sooner PRN

## 2023-10-09 ENCOUNTER — HOSPITAL ENCOUNTER (OUTPATIENT)
Dept: CARDIOLOGY | Facility: HOSPITAL | Age: 63
Discharge: HOME | End: 2023-10-09
Attending: PODIATRIST
Payer: COMMERCIAL

## 2023-10-09 DIAGNOSIS — M79.671 PAIN IN BOTH FEET: ICD-10-CM

## 2023-10-09 DIAGNOSIS — M79.672 PAIN IN BOTH FEET: ICD-10-CM

## 2023-10-09 PROCEDURE — 93922 ANKLE BRACHIAL INDICES (ABI): ICD-10-PCS | Mod: 26,,, | Performed by: SURGERY

## 2023-10-09 PROCEDURE — 93922 UPR/L XTREMITY ART 2 LEVELS: CPT | Mod: 26,,, | Performed by: SURGERY

## 2023-10-09 PROCEDURE — 93922 UPR/L XTREMITY ART 2 LEVELS: CPT

## 2023-10-18 LAB
LEFT ABI: 1.26
LEFT ARM BP: 161 MMHG
LEFT DORSALIS PEDIS: 203 MMHG
LEFT POSTERIOR TIBIAL: 203 MMHG
LEFT TBI: 0.73
LEFT TOE PRESSURE: 118 MMHG
RIGHT ABI: 1.28
RIGHT ARM BP: 158 MMHG
RIGHT DORSALIS PEDIS: 203 MMHG
RIGHT POSTERIOR TIBIAL: 206 MMHG
RIGHT TBI: 0.79
RIGHT TOE PRESSURE: 127 MMHG

## 2024-03-17 DIAGNOSIS — H40.023 OPEN ANGLE WITH BORDERLINE FINDINGS AND HIGH GLAUCOMA RISK IN BOTH EYES: ICD-10-CM

## 2024-03-18 RX ORDER — LATANOPROST 50 UG/ML
1 SOLUTION/ DROPS OPHTHALMIC NIGHTLY
Qty: 7.5 ML | Refills: 0 | Status: SHIPPED | OUTPATIENT
Start: 2024-03-18

## 2025-01-30 ENCOUNTER — TELEPHONE (OUTPATIENT)
Dept: OPHTHALMOLOGY | Facility: CLINIC | Age: 65
End: 2025-01-30

## 2025-01-30 NOTE — TELEPHONE ENCOUNTER
----- Message from Binta sent at 1/30/2025 10:48 AM CST -----  Contact: [phone]  Patient flight delayed asking if he can come in tomorrow